# Patient Record
Sex: MALE | URBAN - METROPOLITAN AREA
[De-identification: names, ages, dates, MRNs, and addresses within clinical notes are randomized per-mention and may not be internally consistent; named-entity substitution may affect disease eponyms.]

---

## 2017-07-27 ENCOUNTER — APPOINTMENT (RX ONLY)
Dept: URBAN - METROPOLITAN AREA CLINIC 45 | Facility: CLINIC | Age: 27
Setting detail: DERMATOLOGY
End: 2017-07-27

## 2017-07-27 VITALS — WEIGHT: 180 LBS | HEIGHT: 72 IN

## 2017-07-27 DIAGNOSIS — L85.3 XEROSIS CUTIS: ICD-10-CM

## 2017-07-27 DIAGNOSIS — L91.0 HYPERTROPHIC SCAR: ICD-10-CM

## 2017-07-27 DIAGNOSIS — L70.0 ACNE VULGARIS: ICD-10-CM

## 2017-07-27 PROCEDURE — ? COUNSELING

## 2017-07-27 PROCEDURE — ? TREATMENT REGIMEN

## 2017-07-27 PROCEDURE — 11900 INJECT SKIN LESIONS </W 7: CPT

## 2017-07-27 PROCEDURE — ? INTRALESIONAL KENALOG

## 2017-07-27 PROCEDURE — 99213 OFFICE O/P EST LOW 20 MIN: CPT | Mod: 25

## 2017-07-27 PROCEDURE — ? PRESCRIPTION

## 2017-07-27 RX ORDER — BENZOYL PEROXIDE 95 MG/G
AEROSOL, FOAM TOPICAL
Qty: 1 | Refills: 5 | Status: ERX | COMMUNITY
Start: 2017-07-27

## 2017-07-27 RX ORDER — MINOCYCLINE HYDROCHLORIDE 115 MG/1
TABLET, FILM COATED, EXTENDED RELEASE ORAL
Qty: 30 | Refills: 3 | Status: ERX | COMMUNITY
Start: 2017-07-27

## 2017-07-27 RX ORDER — CLINDAMYCIN PHOSPHATE 10 MG/G
AEROSOL, FOAM TOPICAL
Qty: 1 | Refills: 4 | Status: ERX | COMMUNITY
Start: 2017-07-27

## 2017-07-27 RX ADMIN — BENZOYL PEROXIDE: 95 AEROSOL, FOAM TOPICAL at 00:00

## 2017-07-27 RX ADMIN — CLINDAMYCIN PHOSPHATE: 10 AEROSOL, FOAM TOPICAL at 00:00

## 2017-07-27 RX ADMIN — MINOCYCLINE HYDROCHLORIDE: 115 TABLET, FILM COATED, EXTENDED RELEASE ORAL at 00:00

## 2017-07-27 ASSESSMENT — LOCATION DETAILED DESCRIPTION DERM
LOCATION DETAILED: LEFT SUPERIOR LATERAL UPPER BACK
LOCATION DETAILED: LEFT SUPERIOR MEDIAL MIDBACK
LOCATION DETAILED: RIGHT CENTRAL MALAR CHEEK
LOCATION DETAILED: RIGHT INFERIOR LATERAL FOREHEAD
LOCATION DETAILED: INFERIOR LUMBAR SPINE
LOCATION DETAILED: RIGHT INFERIOR MEDIAL UPPER BACK
LOCATION DETAILED: LEFT SUPERIOR MEDIAL FOREHEAD
LOCATION DETAILED: INFERIOR THORACIC SPINE
LOCATION DETAILED: RIGHT SUPERIOR MEDIAL UPPER BACK
LOCATION DETAILED: LEFT LATERAL MALAR CHEEK
LOCATION DETAILED: RIGHT MID-UPPER BACK
LOCATION DETAILED: LEFT INFERIOR LATERAL FOREHEAD

## 2017-07-27 ASSESSMENT — LOCATION SIMPLE DESCRIPTION DERM
LOCATION SIMPLE: RIGHT CHEEK
LOCATION SIMPLE: LEFT UPPER BACK
LOCATION SIMPLE: LEFT FOREHEAD
LOCATION SIMPLE: LEFT LOWER BACK
LOCATION SIMPLE: UPPER BACK
LOCATION SIMPLE: LEFT CHEEK
LOCATION SIMPLE: RIGHT FOREHEAD
LOCATION SIMPLE: RIGHT UPPER BACK
LOCATION SIMPLE: LOWER BACK

## 2017-07-27 ASSESSMENT — LOCATION ZONE DERM
LOCATION ZONE: FACE
LOCATION ZONE: TRUNK

## 2017-07-27 NOTE — PROCEDURE: INTRALESIONAL KENALOG
Medical Necessity Clause: This procedure was medically necessary because the lesions that were treated were:
Include Z78.9 (Other Specified Conditions Influencing Health Status) As An Associated Diagnosis?: No
Consent: The risks of atrophy were reviewed with the patient.
Kenalog Preparation: Kenalog
Total Volume Injected (Ccs- Only Use Numbers And Decimals): .5
Detail Level: Detailed
X Size Of Lesion In Cm (Optional): 0
Concentration Of Solution Injected (Mg/Ml): 2.5
Total Volume Injected (Ccs- Only Use Numbers And Decimals): 1.5
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
## 2017-08-08 ENCOUNTER — RX ONLY (OUTPATIENT)
Age: 27
Setting detail: RX ONLY
End: 2017-08-08

## 2017-08-08 RX ORDER — MINOCYCLINE HYDROCHLORIDE 115 MG/1
TABLET, FILM COATED, EXTENDED RELEASE ORAL
Qty: 30 | Refills: 3 | Status: ERX

## 2017-09-27 ENCOUNTER — APPOINTMENT (RX ONLY)
Dept: URBAN - METROPOLITAN AREA CLINIC 45 | Facility: CLINIC | Age: 27
Setting detail: DERMATOLOGY
End: 2017-09-27

## 2017-09-27 DIAGNOSIS — L91.0 HYPERTROPHIC SCAR: ICD-10-CM

## 2017-09-27 DIAGNOSIS — L70.0 ACNE VULGARIS: ICD-10-CM

## 2017-09-27 PROCEDURE — 11900 INJECT SKIN LESIONS </W 7: CPT

## 2017-09-27 PROCEDURE — ? COUNSELING

## 2017-09-27 PROCEDURE — ? INTRALESIONAL KENALOG

## 2017-09-27 PROCEDURE — ? PRESCRIPTION

## 2017-09-27 PROCEDURE — 99213 OFFICE O/P EST LOW 20 MIN: CPT | Mod: 25

## 2017-09-27 PROCEDURE — ? TREATMENT REGIMEN

## 2017-09-27 RX ORDER — BENZOYL PEROXIDE 95 MG/G
AEROSOL, FOAM TOPICAL
Qty: 1 | Refills: 5 | Status: ERX

## 2017-09-27 ASSESSMENT — LOCATION DETAILED DESCRIPTION DERM
LOCATION DETAILED: LEFT SUPERIOR UPPER BACK
LOCATION DETAILED: LEFT INFERIOR LATERAL FOREHEAD
LOCATION DETAILED: INFERIOR LUMBAR SPINE
LOCATION DETAILED: RIGHT CENTRAL MALAR CHEEK
LOCATION DETAILED: RIGHT SUPERIOR MEDIAL UPPER BACK
LOCATION DETAILED: INFERIOR THORACIC SPINE
LOCATION DETAILED: LEFT LATERAL MALAR CHEEK
LOCATION DETAILED: LEFT SUPERIOR MEDIAL FOREHEAD
LOCATION DETAILED: LEFT SUPERIOR LATERAL UPPER BACK
LOCATION DETAILED: LEFT NASAL SIDEWALL
LOCATION DETAILED: RIGHT INFERIOR LATERAL FOREHEAD

## 2017-09-27 ASSESSMENT — LOCATION SIMPLE DESCRIPTION DERM
LOCATION SIMPLE: LEFT UPPER BACK
LOCATION SIMPLE: LEFT FOREHEAD
LOCATION SIMPLE: RIGHT FOREHEAD
LOCATION SIMPLE: LOWER BACK
LOCATION SIMPLE: RIGHT CHEEK
LOCATION SIMPLE: UPPER BACK
LOCATION SIMPLE: LEFT NOSE
LOCATION SIMPLE: RIGHT UPPER BACK
LOCATION SIMPLE: LEFT CHEEK

## 2017-09-27 ASSESSMENT — LOCATION ZONE DERM
LOCATION ZONE: FACE
LOCATION ZONE: NOSE
LOCATION ZONE: TRUNK
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
## 2017-09-27 NOTE — PROCEDURE: INTRALESIONAL KENALOG
Treatment Number (Optional): 2
Kenalog Preparation: Kenalog with 5-fluorouracil
X Size Of Lesion In Cm (Optional): 0
Concentration Of Solution Injected (Mg/Ml): 40.0
Detail Level: Detailed
Include Z78.9 (Other Specified Conditions Influencing Health Status) As An Associated Diagnosis?: No
Medical Necessity Clause: This procedure was medically necessary because the lesions that were treated were:
Consent: The risks of atrophy were reviewed with the patient.

## 2017-12-01 ENCOUNTER — APPOINTMENT (RX ONLY)
Dept: URBAN - METROPOLITAN AREA CLINIC 77 | Facility: CLINIC | Age: 27
Setting detail: DERMATOLOGY
End: 2017-12-01

## 2017-12-01 DIAGNOSIS — L70.0 ACNE VULGARIS: ICD-10-CM

## 2017-12-01 DIAGNOSIS — L85.3 XEROSIS CUTIS: ICD-10-CM

## 2017-12-01 DIAGNOSIS — Z79.899 OTHER LONG TERM (CURRENT) DRUG THERAPY: ICD-10-CM

## 2017-12-01 PROCEDURE — ? PRESCRIPTION

## 2017-12-01 PROCEDURE — ? COUNSELING

## 2017-12-01 PROCEDURE — ? TREATMENT REGIMEN

## 2017-12-01 PROCEDURE — 99214 OFFICE O/P EST MOD 30 MIN: CPT

## 2017-12-01 RX ORDER — ISOTRETINOIN 40 MG/1
CAPSULE ORAL
Qty: 30 | Refills: 0 | Status: ERX | COMMUNITY
Start: 2017-12-01

## 2017-12-01 RX ADMIN — ISOTRETINOIN: 40 CAPSULE ORAL at 00:00

## 2017-12-01 ASSESSMENT — LOCATION ZONE DERM: LOCATION ZONE: TRUNK

## 2017-12-01 ASSESSMENT — LOCATION SIMPLE DESCRIPTION DERM: LOCATION SIMPLE: RIGHT UPPER BACK

## 2017-12-01 ASSESSMENT — LOCATION DETAILED DESCRIPTION DERM
LOCATION DETAILED: RIGHT SUPERIOR UPPER BACK
LOCATION DETAILED: RIGHT MEDIAL UPPER BACK
LOCATION DETAILED: RIGHT MID-UPPER BACK

## 2017-12-01 NOTE — PROCEDURE: TREATMENT REGIMEN
Detail Level: Zone
Plan: Location: chest, back\\nPrescribe: Absorica 40mg po qd x 30 days\\nPharmacy: [facility]\\n---Starting 1st month-----\\nPt is here for a follow up after taking Solodyn 115mg, riax 9.5% foam, and evoclin 1% foam \\nDiscussed with him that his acne has improved with the current treatment regimen, however he has a habit of picking whenever new acne lesions form. Instructed him that if he avoids picking, his acne can continue to improve. Advised that if his acne is painful, then Accutane is another option. \\nPt states that he is tired of having painful nodulocystic acne and feels comfortable starting on Accutane therapy\\nWe had a long discussion about starting Accutane, side effects to be cognizant of, and the ipledge program.\\nHe is concerned about the side effects from isotretinoin because he is currently taking Cymbalta (anti depressant), however I reassured him that we monitor him on a monthly basis and instructed that if he notices abnormal side effects, can address it.\\nToday we will enroll him in ipledge and start him on his first month of Accutane.\\nInstructed to take Accutane with a fatty meal to properly absorb isotretinoin.\\nRecommend using Cerave MC to help reestablish a healthy skin barrier and limit soap to only oily areas of the body

## 2018-01-10 ENCOUNTER — APPOINTMENT (RX ONLY)
Dept: URBAN - METROPOLITAN AREA CLINIC 45 | Facility: CLINIC | Age: 28
Setting detail: DERMATOLOGY
End: 2018-01-10

## 2018-01-10 DIAGNOSIS — L85.3 XEROSIS CUTIS: ICD-10-CM

## 2018-01-10 DIAGNOSIS — L70.0 ACNE VULGARIS: ICD-10-CM

## 2018-01-10 DIAGNOSIS — L91.0 HYPERTROPHIC SCAR: ICD-10-CM

## 2018-01-10 DIAGNOSIS — Z79.899 OTHER LONG TERM (CURRENT) DRUG THERAPY: ICD-10-CM

## 2018-01-10 PROCEDURE — 99214 OFFICE O/P EST MOD 30 MIN: CPT | Mod: 25

## 2018-01-10 PROCEDURE — ? TREATMENT REGIMEN

## 2018-01-10 PROCEDURE — ? COUNSELING

## 2018-01-10 PROCEDURE — 11900 INJECT SKIN LESIONS </W 7: CPT

## 2018-01-10 PROCEDURE — ? INTRALESIONAL KENALOG

## 2018-01-10 PROCEDURE — ? PRESCRIPTION

## 2018-01-10 RX ORDER — ISOTRETINOIN 40 MG/1
CAPSULE ORAL
Qty: 60 | Refills: 0 | Status: ERX

## 2018-01-10 ASSESSMENT — LOCATION DETAILED DESCRIPTION DERM
LOCATION DETAILED: LEFT SUPERIOR LATERAL UPPER BACK
LOCATION DETAILED: RIGHT MEDIAL UPPER BACK
LOCATION DETAILED: LEFT SUPERIOR UPPER BACK
LOCATION DETAILED: RIGHT MID-UPPER BACK
LOCATION DETAILED: RIGHT SUPERIOR UPPER BACK

## 2018-01-10 ASSESSMENT — LOCATION SIMPLE DESCRIPTION DERM
LOCATION SIMPLE: LEFT UPPER BACK
LOCATION SIMPLE: RIGHT UPPER BACK

## 2018-01-10 ASSESSMENT — LOCATION ZONE DERM: LOCATION ZONE: TRUNK

## 2018-01-10 NOTE — PROCEDURE: INTRALESIONAL KENALOG
X Size Of Lesion In Cm (Optional): 0
Concentration Of Solution Injected (Mg/Ml): 40.0
Total Volume Injected (Ccs- Only Use Numbers And Decimals): 1
Include Z78.9 (Other Specified Conditions Influencing Health Status) As An Associated Diagnosis?: No
Kenalog Preparation: Kenalog
Consent: The risks of atrophy were reviewed with the patient.
Medical Necessity Clause: This procedure was medically necessary because the lesions that were treated were:
Detail Level: Detailed

## 2018-01-10 NOTE — PROCEDURE: TREATMENT REGIMEN
Plan: Location: Face/Chest/Back\\nDuration: Finished 1st- Going on 2nd\\nPrescribe: Myorisan 80mg po qd x 30 days - we are sending for Absorica with hope that he eventually gets the better medication, but will most likely receive Myorisan\\n                   Can continue Riax and Evoclin Foam if able to tolerate \\n[facility]armacy: [facility]\\nIpledge: [phone]\\n\\n\\nPatient is here for one month follow up and states that he is noticing a lot of improvement to his acne after his first month of Accutane.\\nPatient states that he has absolutely no side effects other than dryness.\\nPatient forgot to do labwork but will get it done TODAY.\\nDiscussed with patient that we will increase dosage to 80mg po QD and if he does not hear from us, that means his blood work came back normal when we review it tomorrow.\\nDiscussed with him that his acne has improved with the current treatment regimen, however he has a habit of picking whenever new acne lesions form. Instructed him that if he avoids picking, his acne can continue to improve. Advised that if his acne is painful, then Accutane is another option. \\nPt states that he is tired of having painful nodulocystic acne and feels comfortable beingon Accutane therapy\\nDiscussed what side effects to be cognizant of, and the ipledge program.\\nHe is concerned about the side effects from isotretinoin because he is currently taking Cymbalta (anti depressant), however I reassured him that we monitor him on a monthly basis and instructed that if he notices abnormal side effects, can address it.\\nToday we will enroll him in ipledge and start him on his first month of Accutane.\\nInstructed to take Accutane with a fatty meal to properly absorb isotretinoin.\\nRecommend using Cerave MC to help reestablish a healthy skin barrier and limit soap to only oily areas of the body
Detail Level: Zone

## 2018-02-15 ENCOUNTER — APPOINTMENT (RX ONLY)
Dept: URBAN - METROPOLITAN AREA CLINIC 45 | Facility: CLINIC | Age: 28
Setting detail: DERMATOLOGY
End: 2018-02-15

## 2018-02-15 DIAGNOSIS — L01.01 NON-BULLOUS IMPETIGO: ICD-10-CM

## 2018-02-15 DIAGNOSIS — L70.0 ACNE VULGARIS: ICD-10-CM

## 2018-02-15 DIAGNOSIS — A63.0 ANOGENITAL (VENEREAL) WARTS: ICD-10-CM

## 2018-02-15 DIAGNOSIS — B00.1 HERPESVIRAL VESICULAR DERMATITIS: ICD-10-CM

## 2018-02-15 PROCEDURE — 17110 DESTRUCTION B9 LES UP TO 14: CPT

## 2018-02-15 PROCEDURE — ? LIQUID NITROGEN

## 2018-02-15 PROCEDURE — 99214 OFFICE O/P EST MOD 30 MIN: CPT | Mod: 25

## 2018-02-15 PROCEDURE — ? COUNSELING

## 2018-02-15 PROCEDURE — ? TREATMENT REGIMEN

## 2018-02-15 PROCEDURE — ? PRESCRIPTION

## 2018-02-15 RX ORDER — VALACYCLOVIR HYDROCHLORIDE 1 G/1
TABLET, FILM COATED ORAL
Qty: 30 | Refills: 1 | Status: ERX | COMMUNITY
Start: 2018-02-15

## 2018-02-15 RX ORDER — SULFAMETHOXAZOLE AND TRIMETHOPRIM 800; 160 MG/1; MG/1
TABLET ORAL
Qty: 28 | Refills: 0 | Status: ERX | COMMUNITY
Start: 2018-02-15

## 2018-02-15 RX ORDER — ISOTRETINOIN 40 MG/1
CAPSULE ORAL
Qty: 60 | Refills: 0 | Status: ERX

## 2018-02-15 RX ADMIN — VALACYCLOVIR HYDROCHLORIDE: 1 TABLET, FILM COATED ORAL at 00:00

## 2018-02-15 RX ADMIN — SULFAMETHOXAZOLE AND TRIMETHOPRIM: 800; 160 TABLET ORAL at 00:00

## 2018-02-15 ASSESSMENT — LOCATION DETAILED DESCRIPTION DERM
LOCATION DETAILED: RIGHT MID-UPPER BACK
LOCATION DETAILED: RIGHT DORSAL SHAFT OF PENIS
LOCATION DETAILED: RIGHT MEDIAL UPPER BACK

## 2018-02-15 ASSESSMENT — LOCATION ZONE DERM
LOCATION ZONE: TRUNK
LOCATION ZONE: PENIS

## 2018-02-15 ASSESSMENT — LOCATION SIMPLE DESCRIPTION DERM
LOCATION SIMPLE: RIGHT UPPER BACK
LOCATION SIMPLE: PENIS

## 2018-02-15 NOTE — PROCEDURE: LIQUID NITROGEN
Medical Necessity Information: It is in your best interest to select a reason for this procedure from the list below. All of these items fulfill various CMS LCD requirements except the new and changing color options.
Add 52 Modifier (Optional): no
Number Of Freeze-Thaw Cycles: 3 freeze-thaw cycles
Medical Necessity Clause: This procedure was medically necessary because the lesions that were treated were:
Post-Care Instructions: I reviewed with the patient in detail post-care instructions. Patient is to wear sunprotection, and avoid picking at any of the treated lesions. Pt may apply Vaseline to crusted or scabbing areas.
Detail Level: Detailed
Consent: The patient's consent was obtained including but not limited to risks of crusting, scabbing, blistering, scarring, darker or lighter pigmentary change, recurrence, incomplete removal and infection.

## 2018-02-15 NOTE — PROCEDURE: TREATMENT REGIMEN
Plan: Location: penis\\nTreatment: LN2-1X\\n\\nDiscussed with patient that they are a common HPV viral infection that occurs during stress and immune system is unable to fight the virus.\\nWill treat with cryotherapy today to allow immune system to fight off virus. \\nDiscussed with pt if wart blister ups, then can use sterile needle to poke and let fluid drain.\\nDiscussed with pt that they should leave skin attached to help provide a protective barrier while it is healing.\\n\\nDiscussed with pt if it does not disappear, then we my have to treat with Cidofovir anti viral injection at future appointment.\\nAlthough it is not FDA approved, is very effective. \\nDiscussed anticipated side effects of Cidofovir treatment that may include discomfort, burning, swelling, redness, and blistering.\\nF/u as needed
Detail Level: Zone
Plan: Location: lips\\nPrescribe: Bactrim  mg-160 mg tablet (Take 1 tablet by mouth twice a day for 2 weeks)\\n\\nPt has irritation around lips today.\\nPt discussed that it is very irritated and inflamed today.\\nToday there is honey yellow crust developing around the inflammation.\\nDiscussed with pt that this is most likely impetigo.\\nDiscussed with pt that this is a bacterial skin infection that often comes on irritated skin.\\nDiscussed being gentle with the skin---no peroxide or etoh, no neosporin.\\n\\nWill also prescribe pt Bactrim  mg-160 mg tablet (Take 1 tablet by mouth twice a day for 2 weeks) to help fight infection.\\nF/u as needed
Plan: Location: Face/Chest/Back\\nDuration: Finished 2nd - Going on 3nd\\nPrescribe: Myorisan 80mg po qd x 30 days - we are sending for Absorica with hope that he eventually gets the better medication, but will most likely receive Myorisan\\n[facility]armacy: [facility]\\nIpledge: [phone]\\n\\nPatient is here for one month follow up.\\nPt discussed that he is on Myorisan 80mg qd with fatty meal.\\nPatient states that he has absolutely no side effects other than dryness.\\nDiscussed with pt that his lips look infected with impetigo today.\\nWill send off Bactrim DS BID x 14 days to help fight infection.\\nDiscussed with pt that he could take 2-3 days off medication to let his lips heal.\\n\\nDiscussed with him that his acne has improved greatly with the current treatment regimen.\\nDiscussed with pt that he has mostly post inflammatory hyperpigmentation on back and face today.\\nDiscussed with pt to not pick at new acne lesions and advised him that he could use bandages to help him from not picking.\\n\\nPatient discussed that he forgot to do labwork but will get it done TODAY.\\nDiscussed with pt that we will continue him on 80mg po QD.\\nDiscussed with pt that if he does not hear from us, that means his blood work came back normal.\\nInstructed to take Accutane with a fatty meal to properly absorb isotretinoin.\\nDiscussed what side effects to be cognizant of, and the ipledge program.\\nRecommend using Cerave MC to help reestablish a healthy skin barrier and limit soap to only oily areas of the body.\\n\\nPt is currently taking Cymbalta (anti depressant) instructed that if he notices abnormal side effects, can address it.\\nF/u in 1 month

## 2018-03-16 ENCOUNTER — RX ONLY (OUTPATIENT)
Age: 28
Setting detail: RX ONLY
End: 2018-03-16

## 2018-03-16 RX ORDER — SULFAMETHOXAZOLE AND TRIMETHOPRIM 800; 160 MG/1; MG/1
TABLET ORAL
Qty: 10 | Refills: 0 | Status: ERX

## 2018-03-21 ENCOUNTER — APPOINTMENT (RX ONLY)
Dept: URBAN - METROPOLITAN AREA CLINIC 45 | Facility: CLINIC | Age: 28
Setting detail: DERMATOLOGY
End: 2018-03-21

## 2018-03-21 DIAGNOSIS — L85.3 XEROSIS CUTIS: ICD-10-CM

## 2018-03-21 DIAGNOSIS — L70.0 ACNE VULGARIS: ICD-10-CM

## 2018-03-21 DIAGNOSIS — Z79.899 OTHER LONG TERM (CURRENT) DRUG THERAPY: ICD-10-CM

## 2018-03-21 PROCEDURE — 99214 OFFICE O/P EST MOD 30 MIN: CPT

## 2018-03-21 PROCEDURE — ? COUNSELING

## 2018-03-21 PROCEDURE — ? PRESCRIPTION

## 2018-03-21 PROCEDURE — ? TREATMENT REGIMEN

## 2018-03-21 RX ORDER — CLOBETASOL PROPIONATE 0.46 MG/ML
SOLUTION TOPICAL
Qty: 1 | Refills: 3 | Status: ERX | COMMUNITY
Start: 2018-03-21

## 2018-03-21 RX ORDER — ISOTRETINOIN 40 MG/1
CAPSULE ORAL
Qty: 60 | Refills: 0 | Status: ERX

## 2018-03-21 RX ADMIN — CLOBETASOL PROPIONATE: 0.46 SOLUTION TOPICAL at 00:00

## 2018-03-21 ASSESSMENT — LOCATION DETAILED DESCRIPTION DERM
LOCATION DETAILED: RIGHT MEDIAL UPPER BACK
LOCATION DETAILED: RIGHT MID-UPPER BACK

## 2018-03-21 ASSESSMENT — LOCATION ZONE DERM: LOCATION ZONE: TRUNK

## 2018-03-21 ASSESSMENT — LOCATION SIMPLE DESCRIPTION DERM: LOCATION SIMPLE: RIGHT UPPER BACK

## 2018-03-21 NOTE — PROCEDURE: TREATMENT REGIMEN
Plan: Location: Face/Chest/Back\\nDuration: Finished 3rd- Going on 4th\\nPrescribe: Myorisan 80mg po qd x 30 days - we are sending for Absorica with hope that he eventually gets the better medication, but will most likely receive Myorisan\\nclobetasol 0.05 % scalp solution --Apply to scalp once daily when having flare up \\n[facility]armacy: [facility]\\nIpledge: [phone]\\n\\nPatient is here for 4th month acne follow up and is responding well to medication\\nPt is currently taking/ will continue pt on Myorisan 80mg qd with fatty meal.\\nPatient states that he has absolutely no side effects other than dryness.\\nDiscussed with pt that his lips don’t look as infected with impetigo today— pt is currently taking Bactrim DS BID x 14 days to help fight infection\\nInstructed pt to take 5 days off medication to let his lips heal— instructed to keep lips well moisturized with Aquaphor \\nPt states that his scalp is also very itchy and he is constantly scratching-- will prescribe clobetasol 0.05 % scalp solution Scalp-- Apply to scalp once daily when having flare up; discussed with pt that he can apply solution on scaly, irritated spots on body if neeeded\\n\\nDiscussed with him that his acne has improved greatly with the current treatment regimen.\\nDiscussed with pt that he has mostly post inflammatory hyperpigmentation on back and face today.\\nDiscussed with pt to not pick at new acne lesions and advised him that he could use bandages to help him from not picking.\\n\\nInstructed to take Accutane with a fatty meal to properly absorb isotretinoin.\\nDiscussed what side effects to be cognizant of, and the ipledge program.\\nRecommend using Cerave MC to help reestablish a healthy skin barrier and limit soap to only oily areas of the body.\\n\\nPt is currently taking Cymbalta (anti depressant) instructed that if he notices abnormal side effects, can address it.\\n\\nF/u in 1 month
Detail Level: Zone

## 2018-03-23 ENCOUNTER — RX ONLY (OUTPATIENT)
Age: 28
Setting detail: RX ONLY
End: 2018-03-23

## 2018-03-23 RX ORDER — SULFAMETHOXAZOLE AND TRIMETHOPRIM 800; 160 MG/1; MG/1
TABLET ORAL
Qty: 20 | Refills: 0 | Status: ERX

## 2018-04-25 ENCOUNTER — APPOINTMENT (RX ONLY)
Dept: URBAN - METROPOLITAN AREA CLINIC 45 | Facility: CLINIC | Age: 28
Setting detail: DERMATOLOGY
End: 2018-04-25

## 2018-04-25 DIAGNOSIS — L85.3 XEROSIS CUTIS: ICD-10-CM

## 2018-04-25 DIAGNOSIS — Z79.899 OTHER LONG TERM (CURRENT) DRUG THERAPY: ICD-10-CM

## 2018-04-25 DIAGNOSIS — L70.0 ACNE VULGARIS: ICD-10-CM

## 2018-04-25 PROCEDURE — ? PRESCRIPTION

## 2018-04-25 PROCEDURE — 99214 OFFICE O/P EST MOD 30 MIN: CPT

## 2018-04-25 PROCEDURE — ? TREATMENT REGIMEN

## 2018-04-25 PROCEDURE — ? COUNSELING

## 2018-04-25 RX ORDER — ISOTRETINOIN 40 MG/1
CAPSULE ORAL
Qty: 60 | Refills: 0 | Status: ERX

## 2018-04-25 ASSESSMENT — LOCATION ZONE DERM: LOCATION ZONE: TRUNK

## 2018-04-25 ASSESSMENT — LOCATION SIMPLE DESCRIPTION DERM: LOCATION SIMPLE: RIGHT UPPER BACK

## 2018-04-25 ASSESSMENT — LOCATION DETAILED DESCRIPTION DERM
LOCATION DETAILED: RIGHT MEDIAL UPPER BACK
LOCATION DETAILED: RIGHT MID-UPPER BACK

## 2018-04-25 NOTE — PROCEDURE: TREATMENT REGIMEN
Detail Level: Zone
Plan: Location: Face/Chest/Back\\nDuration: Finished 4th - Going on 5th\\nPrescribe: Myorisan 80mg po qd x 30 days - we are sending for Absorica with hope that he eventually gets the better medication, but will most likely receive Myorisan\\nContinue clobetasol 0.05 % scalp solution --Apply to scalp once daily when having flare up \\nPharmacy: [facility]\\nIpledge: [phone]\\n\\nPatient is here for one month acne follow up and is still responding well to medication\\nPt is currently taking/ will continue pt on Myorisan 80mg qd with fatty meal.\\nPatient states that he has absolutely no side effects other than dryness.\\nDiscussed with pt that his lips don’t look as infected with impetigo today— patient was previously taking Bactrim DS BID x 14 days to help fight infection\\n\\nDiscussed with him that his acne has improved greatly with the current treatment regimen.\\nDiscussed with pt that he has mostly post inflammatory hyperpigmentation on back and face today.\\nDiscussed with pt to not pick at new acne lesions and advised him that he could use bandages to help him from not picking.\\n\\nInstructed to take Accutane with a fatty meal to properly absorb isotretinoin.\\nDiscussed what side effects to be cognizant of, and the ipledge program.\\nRecommend using Cerave MC to help reestablish a healthy skin barrier and limit soap to only oily areas of the body.\\n\\nPt is currently taking Cymbalta (anti depressant) instructed that if he notices abnormal side effects, can address it.\\n\\nF/u in 1 month

## 2018-06-06 ENCOUNTER — APPOINTMENT (RX ONLY)
Dept: URBAN - METROPOLITAN AREA CLINIC 45 | Facility: CLINIC | Age: 28
Setting detail: DERMATOLOGY
End: 2018-06-06

## 2018-06-06 DIAGNOSIS — Z79.899 OTHER LONG TERM (CURRENT) DRUG THERAPY: ICD-10-CM

## 2018-06-06 DIAGNOSIS — L70.0 ACNE VULGARIS: ICD-10-CM

## 2018-06-06 DIAGNOSIS — L85.3 XEROSIS CUTIS: ICD-10-CM

## 2018-06-06 PROCEDURE — ? PRESCRIPTION

## 2018-06-06 PROCEDURE — ? TREATMENT REGIMEN

## 2018-06-06 PROCEDURE — 99214 OFFICE O/P EST MOD 30 MIN: CPT

## 2018-06-06 PROCEDURE — ? COUNSELING

## 2018-06-06 RX ORDER — ISOTRETINOIN 40 MG/1
CAPSULE ORAL
Qty: 60 | Refills: 0 | Status: ERX

## 2018-06-06 ASSESSMENT — LOCATION DETAILED DESCRIPTION DERM
LOCATION DETAILED: RIGHT MEDIAL UPPER BACK
LOCATION DETAILED: RIGHT MID-UPPER BACK

## 2018-06-06 ASSESSMENT — LOCATION SIMPLE DESCRIPTION DERM: LOCATION SIMPLE: RIGHT UPPER BACK

## 2018-06-06 ASSESSMENT — LOCATION ZONE DERM: LOCATION ZONE: TRUNK

## 2018-06-06 NOTE — PROCEDURE: TREATMENT REGIMEN
Detail Level: Zone
Plan: Location: Face/Chest/Back\\nDuration: Finished 5th - Going on 6th\\nPrescribe: Myorisan 80mg po qd x 30 days - we are sending for Absorica with hope that he eventually gets the better medication, but will most likely receive Myorisan\\n[facility]armacy: [facility]\\nIpledge: [phone]\\n\\nPatient is here for one month acne follow up and is still responding well to medication\\nToday, Pt presents post-inflammatory hyperpigmentation on his face and back \\nPt is currently taking/ will continue pt on Myorisan 80mg qd with fatty meal.\\nPatient states that he has absolutely no side effects other than dryness.\\nDiscussed with pt that his lips don’t look as infected with impetigo today— patient was previously taking Bactrim DS BID x 14 days to help fight infection; Pt states he currently uses Lotrimin cream QD to prevent infection \\n\\nDiscussed with him that his acne has improved greatly with the current treatment regimen.\\nDiscussed with pt that he has mostly post inflammatory hyperpigmentation on back and face today.\\nDiscussed with pt to not pick at new acne lesions and advised him that he could use bandages to help him from not picking.\\n\\nInstructed to take Accutane with a fatty meal to properly absorb isotretinoin.\\nDiscussed what side effects to be cognizant of, and the ipledge program.\\nRecommend using Cerave MC to help reestablish a healthy skin barrier and limit soap to only oily areas of the body.\\n\\nPt is currently taking Cymbalta (anti depressant) instructed that if he notices abnormal side effects, can address it.\\n\\nF/u in 3 months

## 2018-07-30 ENCOUNTER — RX ONLY (OUTPATIENT)
Age: 28
Setting detail: RX ONLY
End: 2018-07-30

## 2018-07-30 RX ORDER — SULFAMETHOXAZOLE AND TRIMETHOPRIM 800; 160 MG/1; MG/1
TABLET ORAL
Qty: 20 | Refills: 0 | Status: ERX

## 2018-09-06 ENCOUNTER — APPOINTMENT (RX ONLY)
Dept: URBAN - METROPOLITAN AREA CLINIC 45 | Facility: CLINIC | Age: 28
Setting detail: DERMATOLOGY
End: 2018-09-06

## 2018-09-06 DIAGNOSIS — Z79.899 OTHER LONG TERM (CURRENT) DRUG THERAPY: ICD-10-CM

## 2018-09-06 DIAGNOSIS — L70.0 ACNE VULGARIS: ICD-10-CM

## 2018-09-06 DIAGNOSIS — L91.0 HYPERTROPHIC SCAR: ICD-10-CM

## 2018-09-06 DIAGNOSIS — L85.3 XEROSIS CUTIS: ICD-10-CM

## 2018-09-06 PROCEDURE — ? INTRALESIONAL KENALOG

## 2018-09-06 PROCEDURE — ? TREATMENT REGIMEN

## 2018-09-06 PROCEDURE — 99213 OFFICE O/P EST LOW 20 MIN: CPT | Mod: 25

## 2018-09-06 PROCEDURE — ? COUNSELING

## 2018-09-06 ASSESSMENT — LOCATION DETAILED DESCRIPTION DERM
LOCATION DETAILED: LEFT MID-UPPER BACK
LOCATION DETAILED: RIGHT MID-UPPER BACK
LOCATION DETAILED: LEFT SUPERIOR LATERAL UPPER BACK
LOCATION DETAILED: RIGHT POSTERIOR SHOULDER
LOCATION DETAILED: RIGHT INFERIOR LATERAL FOREHEAD
LOCATION DETAILED: LEFT SUPERIOR UPPER BACK
LOCATION DETAILED: RIGHT MEDIAL UPPER BACK
LOCATION DETAILED: RIGHT MID TEMPLE

## 2018-09-06 ASSESSMENT — LOCATION SIMPLE DESCRIPTION DERM
LOCATION SIMPLE: RIGHT UPPER BACK
LOCATION SIMPLE: RIGHT TEMPLE
LOCATION SIMPLE: LEFT UPPER BACK
LOCATION SIMPLE: RIGHT FOREHEAD
LOCATION SIMPLE: RIGHT SHOULDER

## 2018-09-06 ASSESSMENT — LOCATION ZONE DERM
LOCATION ZONE: TRUNK
LOCATION ZONE: FACE
LOCATION ZONE: ARM

## 2018-09-06 NOTE — PROCEDURE: INTRALESIONAL KENALOG
Medical Necessity Clause: This procedure was medically necessary because the lesions that were treated were:
Include Z78.9 (Other Specified Conditions Influencing Health Status) As An Associated Diagnosis?: No
Detail Level: Detailed
Total Volume Injected (Ccs- Only Use Numbers And Decimals): 2
X Size Of Lesion In Cm (Optional): 0
Concentration Of Solution Injected (Mg/Ml): 40.0
Consent: The risks of atrophy were reviewed with the patient.
Kenalog Preparation: Kenalog

## 2018-09-06 NOTE — PROCEDURE: TREATMENT REGIMEN
Plan: Location: Face/Chest/Back\\nDuration: Finished 7th --- completed\\nPrescribe: Myorisan 80mg po qd x 30 days - did not send today\\n[facility]armacy: [facility]\\nIpledge: [phone]\\n\\nPatient is here for 3 month acne follow up.\\nPt discussed that he has been off of medication for 2 months and has not had a flare up.\\nPt states that he still has some hypertrophic scars from previous flare ups.\\nDiscussed with pt that his acne does not look as bad as we first started.\\nWill inject keloids with 5FU/ILK 40 today to help reduce inflammation.\\nF/u as needed \\n-----------------------\\nToday, Pt presents post-inflammatory hyperpigmentation on his face and back \\nPt is currently taking/ will continue pt on Myorisan 80mg qd with fatty meal.\\nPatient states that he has absolutely no side effects other than dryness.\\nDiscussed with pt that his lips don’t look as infected with impetigo today— patient was previously taking Bactrim DS BID x 14 days to help fight infection; Pt states he currently uses Lotrimin cream QD to prevent infection \\n\\nDiscussed with him that his acne has improved greatly with the current treatment regimen.\\nDiscussed with pt that he has mostly post inflammatory hyperpigmentation on back and face today.\\nDiscussed with pt to not pick at new acne lesions and advised him that he could use bandages to help him from not picking.\\n\\nInstructed to take Accutane with a fatty meal to properly absorb isotretinoin.\\nDiscussed what side effects to be cognizant of, and the ipledge program.\\nRecommend using Cerave MC to help reestablish a healthy skin barrier and limit soap to only oily areas of the body.\\n\\nPt is currently taking Cymbalta (anti depressant) instructed that if he notices abnormal side effects, can address it.\\n\\nF/u in 3 months
Detail Level: Zone

## 2018-10-04 ENCOUNTER — APPOINTMENT (RX ONLY)
Dept: URBAN - METROPOLITAN AREA CLINIC 45 | Facility: CLINIC | Age: 28
Setting detail: DERMATOLOGY
End: 2018-10-04

## 2018-10-04 DIAGNOSIS — A63.0 ANOGENITAL (VENEREAL) WARTS: ICD-10-CM

## 2018-10-04 DIAGNOSIS — L663 OTHER SPECIFIED DISEASES OF HAIR AND HAIR FOLLICLES: ICD-10-CM

## 2018-10-04 DIAGNOSIS — L85.3 XEROSIS CUTIS: ICD-10-CM

## 2018-10-04 DIAGNOSIS — L70.0 ACNE VULGARIS: ICD-10-CM

## 2018-10-04 DIAGNOSIS — L73.9 FOLLICULAR DISORDER, UNSPECIFIED: ICD-10-CM

## 2018-10-04 DIAGNOSIS — L738 OTHER SPECIFIED DISEASES OF HAIR AND HAIR FOLLICLES: ICD-10-CM

## 2018-10-04 DIAGNOSIS — B35.1 TINEA UNGUIUM: ICD-10-CM

## 2018-10-04 PROBLEM — L02.92 FURUNCLE, UNSPECIFIED: Status: ACTIVE | Noted: 2018-10-04

## 2018-10-04 PROCEDURE — 17110 DESTRUCTION B9 LES UP TO 14: CPT

## 2018-10-04 PROCEDURE — ? COUNSELING

## 2018-10-04 PROCEDURE — ? INTRALESIONAL KENALOG

## 2018-10-04 PROCEDURE — ? LIQUID NITROGEN

## 2018-10-04 PROCEDURE — ? TREATMENT REGIMEN

## 2018-10-04 PROCEDURE — 99213 OFFICE O/P EST LOW 20 MIN: CPT | Mod: 25

## 2018-10-04 PROCEDURE — ? PRESCRIPTION

## 2018-10-04 RX ORDER — CLINDAMYCIN PHOSPHATE 10 MG/G
AEROSOL, FOAM TOPICAL
Qty: 1 | Refills: 3 | Status: ERX

## 2018-10-04 RX ORDER — UREA 410 MG/G
CREAM TOPICAL
Qty: 1 | Refills: 3 | Status: ERX | COMMUNITY
Start: 2018-10-04

## 2018-10-04 RX ORDER — BENZOYL PEROXIDE 95 MG/G
AEROSOL, FOAM TOPICAL
Qty: 1 | Refills: 3 | Status: ERX

## 2018-10-04 RX ADMIN — UREA: 410 CREAM TOPICAL at 00:00

## 2018-10-04 ASSESSMENT — LOCATION DETAILED DESCRIPTION DERM
LOCATION DETAILED: LEFT DORSAL SHAFT OF PENIS
LOCATION DETAILED: LEFT SUPERIOR LATERAL UPPER BACK
LOCATION DETAILED: RIGHT MEDIAL UPPER BACK
LOCATION DETAILED: RIGHT MID-UPPER BACK
LOCATION DETAILED: RIGHT POSTERIOR SHOULDER
LOCATION DETAILED: LEFT MEDIAL SUPERIOR CHEST
LOCATION DETAILED: LEFT MEDIAL UPPER BACK

## 2018-10-04 ASSESSMENT — LOCATION SIMPLE DESCRIPTION DERM
LOCATION SIMPLE: PENIS
LOCATION SIMPLE: RIGHT UPPER BACK
LOCATION SIMPLE: CHEST
LOCATION SIMPLE: RIGHT SHOULDER
LOCATION SIMPLE: LEFT UPPER BACK

## 2018-10-04 ASSESSMENT — LOCATION ZONE DERM
LOCATION ZONE: PENIS
LOCATION ZONE: TRUNK
LOCATION ZONE: ARM

## 2018-10-04 NOTE — PROCEDURE: LIQUID NITROGEN
Number Of Freeze-Thaw Cycles: 3 freeze-thaw cycles
Detail Level: Detailed
Medical Necessity Clause: This procedure was medically necessary because the lesions that were treated were:
Consent: The patient's consent was obtained including but not limited to risks of crusting, scabbing, blistering, scarring, darker or lighter pigmentary change, recurrence, incomplete removal and infection.
Add 52 Modifier (Optional): no
Medical Necessity Information: It is in your best interest to select a reason for this procedure from the list below. All of these items fulfill various CMS LCD requirements except the new and changing color options.
Post-Care Instructions: I reviewed with the patient in detail post-care instructions. Patient is to wear sunprotection, and avoid picking at any of the treated lesions. Pt may apply Vaseline to crusted or scabbing areas.

## 2018-10-04 NOTE — PROCEDURE: INTRALESIONAL KENALOG
Detail Level: Detailed
Include Z78.9 (Other Specified Conditions Influencing Health Status) As An Associated Diagnosis?: No
X Size Of Lesion In Cm (Optional): 0
Concentration Of Solution Injected (Mg/Ml): 2.5
Total Volume Injected (Ccs- Only Use Numbers And Decimals): 1
Consent: The risks of atrophy were reviewed with the patient.
Kenalog Preparation: Kenalog
Medical Necessity Clause: This procedure was medically necessary because the lesions that were treated were:

## 2018-10-04 NOTE — PROCEDURE: TREATMENT REGIMEN
Plan: Location: Face/Chest/Back\\nDuration: Finished 7th --- completed\\nPrescribe: Myorisan 80mg po qd x 30 days - did not send today\\nPharmacy: [facility]\\nIpledge: [phone]\\n\\nTreatment: 1 cc of 5FU/K40\\n\\nPatient reports his skin is doing “great” and has had no flares the past few months\\nPatient has been off of a Accutane for 2-3 months \\nPatient reports he would like us to treat the keloid on his back \\nDiscussed with patient his keloids have become flatter \\nStrongly recommended patient to stop picking his acne and ingrown hair as it will agitate it \\nToday, will inject 1 cc of 5FU/K40 in keloids on back, chest, and right arm \\n\\nF/u in 3 months \\n—————-\\nPatient is here for 3 month acne follow up.\\nPt discussed that he has been off of medication for 2 months and has not had a flare up.\\nPt states that he still has some hypertrophic scars from previous flare ups.\\nDiscussed with pt that his acne does not look as bad as we first started.\\nWill inject keloids with 5FU/ILK 40 today to help reduce inflammation.\\nF/u as needed \\n-----------------------\\nToday, Pt presents post-inflammatory hyperpigmentation on his face and back \\nPt is currently taking/ will continue pt on Myorisan 80mg qd with fatty meal.\\nPatient states that he has absolutely no side effects other than dryness.\\nDiscussed with pt that his lips don’t look as infected with impetigo today— patient was previously taking Bactrim DS BID x 14 days to help fight infection; Pt states he currently uses Lotrimin cream QD to prevent infection \\n\\nDiscussed with him that his acne has improved greatly with the current treatment regimen.\\nDiscussed with pt that he has mostly post inflammatory hyperpigmentation on back and face today.\\nDiscussed with pt to not pick at new acne lesions and advised him that he could use bandages to help him from not picking.\\n\\nInstructed to take Accutane with a fatty meal to properly absorb isotretinoin.\\nDiscussed what side effects to be cognizant of, and the ipledge program.\\nRecommend using Cerave MC to help reestablish a healthy skin barrier and limit soap to only oily areas of the body.\\n\\nPt is currently taking Cymbalta (anti depressant) instructed that if he notices abnormal side effects, can address it.\\n\\nF/u in 3 months
Detail Level: Zone
Plan: Location: body\\nPrescribe: Riax topical foam \\n                Evoclin foam top qd x 30 days\\n\\nDiscussed with pt that folliculitis is a common skin condition in which hair follicles become inflamed.\\nDiscussed with pt that this is a form of acne can be caused by genetic or hormonal related factors.\\nAdvised acne may be treated with topical and oral antibiotics, or hormonal controlling medications.\\nAlso discussed the possibility of a cure with Accutane, advised patient to consider accutane, Discussed pros and cons, side effects and benefits.\\n\\nDiscussed with pt to take medication with food daily.\\nDiscussed with pt that we will also prescribe Evoclin foam to apply after showering, and Riax topical foam\\nDiscussed with pt to leave on and do daily as needed for flare ups.\\nFollow up as needed
Plan: Location: feet\\nTreatment: urea 41% topical cream\\n\\nPatient reports he may have “athlete’s feet” with continued roughness \\nDiscussed with patient he does not present fungal infection \\nRecommended patient to get a pedicure to help with cleanliness and roughness of feet\\nDiscussed with patient we can prescribe him a car to further reduce roughness of feet\\nToday, will prescribe urea 41% topical cream\\n\\nF/u as needed

## 2018-11-15 ENCOUNTER — APPOINTMENT (RX ONLY)
Dept: URBAN - METROPOLITAN AREA CLINIC 45 | Facility: CLINIC | Age: 28
Setting detail: DERMATOLOGY
End: 2018-11-15

## 2018-11-15 DIAGNOSIS — L73.9 FOLLICULAR DISORDER, UNSPECIFIED: ICD-10-CM

## 2018-11-15 DIAGNOSIS — L85.3 XEROSIS CUTIS: ICD-10-CM

## 2018-11-15 DIAGNOSIS — L91.0 HYPERTROPHIC SCAR: ICD-10-CM

## 2018-11-15 DIAGNOSIS — L28.1 PRURIGO NODULARIS: ICD-10-CM

## 2018-11-15 DIAGNOSIS — L738 OTHER SPECIFIED DISEASES OF HAIR AND HAIR FOLLICLES: ICD-10-CM

## 2018-11-15 DIAGNOSIS — L663 OTHER SPECIFIED DISEASES OF HAIR AND HAIR FOLLICLES: ICD-10-CM

## 2018-11-15 DIAGNOSIS — L70.0 ACNE VULGARIS: ICD-10-CM

## 2018-11-15 PROBLEM — L02.92 FURUNCLE, UNSPECIFIED: Status: ACTIVE | Noted: 2018-11-15

## 2018-11-15 PROCEDURE — ? TREATMENT REGIMEN

## 2018-11-15 PROCEDURE — ? PRESCRIPTION

## 2018-11-15 PROCEDURE — ? COUNSELING

## 2018-11-15 PROCEDURE — 99214 OFFICE O/P EST MOD 30 MIN: CPT | Mod: 25

## 2018-11-15 PROCEDURE — ? INTRALESIONAL KENALOG

## 2018-11-15 RX ORDER — HYDROXYZINE HYDROCHLORIDE 25 MG/1
TABLET, FILM COATED ORAL
Qty: 30 | Refills: 5 | Status: ERX | COMMUNITY
Start: 2018-11-15

## 2018-11-15 RX ORDER — FLURANDRENOLIDE 4 UG/CM2
TAPE TOPICAL
Qty: 2 | Refills: 5 | Status: ERX | COMMUNITY
Start: 2018-11-15

## 2018-11-15 RX ADMIN — HYDROXYZINE HYDROCHLORIDE: 25 TABLET, FILM COATED ORAL at 00:00

## 2018-11-15 RX ADMIN — FLURANDRENOLIDE: 4 TAPE TOPICAL at 00:00

## 2018-11-15 ASSESSMENT — LOCATION DETAILED DESCRIPTION DERM
LOCATION DETAILED: LEFT SUPERIOR LATERAL UPPER BACK
LOCATION DETAILED: LEFT SUPERIOR UPPER BACK
LOCATION DETAILED: RIGHT SUPERIOR UPPER BACK
LOCATION DETAILED: STERNUM
LOCATION DETAILED: RIGHT LATERAL SHOULDER
LOCATION DETAILED: RIGHT LATERAL PROXIMAL UPPER ARM
LOCATION DETAILED: RIGHT MEDIAL SUPERIOR CHEST
LOCATION DETAILED: INFERIOR THORACIC SPINE
LOCATION DETAILED: RIGHT INFERIOR MEDIAL UPPER BACK
LOCATION DETAILED: LEFT MID-UPPER BACK
LOCATION DETAILED: RIGHT MID-UPPER BACK
LOCATION DETAILED: RIGHT SUPERIOR LATERAL UPPER BACK
LOCATION DETAILED: RIGHT MEDIAL UPPER BACK

## 2018-11-15 ASSESSMENT — LOCATION SIMPLE DESCRIPTION DERM
LOCATION SIMPLE: CHEST
LOCATION SIMPLE: RIGHT UPPER BACK
LOCATION SIMPLE: UPPER BACK
LOCATION SIMPLE: RIGHT UPPER ARM
LOCATION SIMPLE: LEFT UPPER BACK
LOCATION SIMPLE: RIGHT SHOULDER

## 2018-11-15 ASSESSMENT — LOCATION ZONE DERM
LOCATION ZONE: TRUNK
LOCATION ZONE: ARM

## 2018-11-15 NOTE — PROCEDURE: TREATMENT REGIMEN
Plan: Location: Face/Chest/Back\\nDuration: Completed 7 months of Accutane in July 2018\\nPharmacy: [facility]\\nIpledge: [phone]\\n\\nToday, pt states that his skin is doing very well and he has not had any active acne lesions on his face/chest/back\\nPt states that he is very satisfied with the results from Accutane\\nPt has Riax and Evoclin foam to treat folliculitis on the body--- discussed with pt that Riax and Evoclin also function to prevent acne\\n\\nF/u as needed
Detail Level: Zone
Plan: Location: body\\nPrescribe: CONT:  Riax topical foam - apply to body, let sit for several minutes then rinse off in shower\\nCONT: Evoclin foam top qd x 30 days after showering\\n\\nPt is here for f/u and states Riax and Evoclin have been very effective in calming the irritation on his body\\nDiscussed with pt that today, he does not have active acne lesions and his folliculitis has calmed\\n\\nWill continue pt on: \\nRiax topical foam, 1 TOP NEGIN QD--- apply to body, let sit for several minutes then rinse off in shower\\nEvoclin foam top qd x 30 days after showering \\n\\nDiscussed with pt that if folliculitis/acne returns, can restart him on Accutane in the future.\\nReiterated that folliculitis is a common skin condition in which hair follicles become inflamed.\\nDiscussed with pt that this is a form of acne can be caused by genetic or hormonal related factors.\\n\\nF/u as needed\\n
Plan: Location: back, chest, arms **pictures taken**\\nTreatment: 2 cc Fabio 40/ 5 FU\\n\\nPrescribe: hydroxyzine HCl 25 mg tablet--- Take 1 tablet nightly for itch\\nCordran Tape Large Roll 4 mcg/cm2-- Apply to affected areas on body when it is clean and dry.\\n\\nPt presents numerous 's nodules on his body\\nPt states he has a habit of picking and scratching at his skin, especially when he is under high levels of stress\\nDiscussed with pt that his scratching is leading to numerous scabs on his body\\nWill inject inflamed nodules today with Fabio 40/ 5 FU as anti-inflammatory\\n\\nWill prescribe:\\nhydroxyzine HCl 25 mg tablet--- Take 1 tablet nightly for itch\\nCordran Tape Large Roll 4 mcg/cm2-- Apply to affected areas on body when it is clean and dry.\\n\\nDiscussed with pt that hydroxyzine relieves itch and it may help him sleep at night\\nDiscussed with pt that Cordran tape contains a topical steroid which will calm the irritation on his skin--- instructed pt to apply tape over 's nodules when the body is clean and dry. Discussed for pt to let tape fall off on their own once applied. Pt can continue usage of Riax and Evoclin foams for folliculitis while using Cordran Tape.\\n\\nAdvised for pt to stop scratching/picking\\nRecommended Cerave MC/YESSY to re-establish and protect skin barrier\\nInstructed pt to limit soap usage to under arms and private area\\n\\nF/u as needed
Plan: Location: left superior lateral upper back **pictures taken**\\nTreatment: 1 cc Fabio 40/ 5 FU injection\\n\\nPt is here for f/u and states he notices significant improvement with the scar on his back post- injection with Ken40/5 FU last month\\nPt states that the area no longer feels itchy or tender\\nDiscussed with pt that the scar is not as raised and has responded well to treatment with Ken40/ 5FU\\nWill inject today to further flatten scar/ reduce inflammation: Ken40/ 5FU\\n\\nF/u as needed

## 2018-11-15 NOTE — PROCEDURE: INTRALESIONAL KENALOG
X Size Of Lesion In Cm (Optional): 0
Kenalog Preparation: Kenalog with 5-fluorouracil
Medical Necessity Clause: This procedure was medically necessary because the lesions that were treated were:
Detail Level: Detailed
Total Volume Injected (Ccs- Only Use Numbers And Decimals): 2
Include Z78.9 (Other Specified Conditions Influencing Health Status) As An Associated Diagnosis?: No
Consent: The risks of atrophy were reviewed with the patient.
Concentration Of Solution Injected (Mg/Ml): 10.0
Total Volume Injected (Ccs- Only Use Numbers And Decimals): 1

## 2019-01-10 ENCOUNTER — APPOINTMENT (RX ONLY)
Dept: URBAN - METROPOLITAN AREA CLINIC 45 | Facility: CLINIC | Age: 29
Setting detail: DERMATOLOGY
End: 2019-01-10

## 2019-01-10 DIAGNOSIS — L74.51 PRIMARY FOCAL HYPERHIDROSIS: ICD-10-CM

## 2019-01-10 DIAGNOSIS — L70.0 ACNE VULGARIS: ICD-10-CM

## 2019-01-10 DIAGNOSIS — L28.1 PRURIGO NODULARIS: ICD-10-CM

## 2019-01-10 DIAGNOSIS — L85.3 XEROSIS CUTIS: ICD-10-CM

## 2019-01-10 DIAGNOSIS — L738 OTHER SPECIFIED DISEASES OF HAIR AND HAIR FOLLICLES: ICD-10-CM

## 2019-01-10 DIAGNOSIS — L663 OTHER SPECIFIED DISEASES OF HAIR AND HAIR FOLLICLES: ICD-10-CM

## 2019-01-10 DIAGNOSIS — L73.9 FOLLICULAR DISORDER, UNSPECIFIED: ICD-10-CM

## 2019-01-10 DIAGNOSIS — L91.0 HYPERTROPHIC SCAR: ICD-10-CM

## 2019-01-10 PROBLEM — L02.92 FURUNCLE, UNSPECIFIED: Status: ACTIVE | Noted: 2019-01-10

## 2019-01-10 PROBLEM — L74.519 PRIMARY FOCAL HYPERHIDROSIS, UNSPECIFIED: Status: ACTIVE | Noted: 2019-01-10

## 2019-01-10 PROCEDURE — 99213 OFFICE O/P EST LOW 20 MIN: CPT | Mod: 25

## 2019-01-10 PROCEDURE — ? PRESCRIPTION

## 2019-01-10 PROCEDURE — ? COUNSELING

## 2019-01-10 PROCEDURE — ? TREATMENT REGIMEN

## 2019-01-10 PROCEDURE — ? INTRALESIONAL KENALOG

## 2019-01-10 PROCEDURE — 11901 INJECT SKIN LESIONS >7: CPT

## 2019-01-10 RX ORDER — GLYCOPYRRONIUM 2.4 G/100G
CLOTH TOPICAL
Qty: 30 | Refills: 2 | Status: ERX | COMMUNITY
Start: 2019-01-10

## 2019-01-10 RX ADMIN — GLYCOPYRRONIUM: 2.4 CLOTH TOPICAL at 00:00

## 2019-01-10 ASSESSMENT — LOCATION SIMPLE DESCRIPTION DERM
LOCATION SIMPLE: LEFT LIP
LOCATION SIMPLE: RIGHT LOWER BACK
LOCATION SIMPLE: RIGHT SHOULDER
LOCATION SIMPLE: RIGHT UPPER BACK
LOCATION SIMPLE: CHEST
LOCATION SIMPLE: LEFT UPPER BACK
LOCATION SIMPLE: RIGHT LIP

## 2019-01-10 ASSESSMENT — LOCATION DETAILED DESCRIPTION DERM
LOCATION DETAILED: RIGHT SUPERIOR LATERAL MIDBACK
LOCATION DETAILED: RIGHT MID-UPPER BACK
LOCATION DETAILED: RIGHT MEDIAL UPPER BACK
LOCATION DETAILED: RIGHT UPPER CUTANEOUS LIP
LOCATION DETAILED: RIGHT ANTERIOR SHOULDER
LOCATION DETAILED: LEFT UPPER CUTANEOUS LIP
LOCATION DETAILED: RIGHT SUPERIOR LATERAL UPPER BACK
LOCATION DETAILED: LEFT SUPERIOR UPPER BACK
LOCATION DETAILED: LEFT SUPERIOR LATERAL UPPER BACK
LOCATION DETAILED: LEFT LATERAL SUPERIOR CHEST
LOCATION DETAILED: LEFT INFERIOR UPPER BACK

## 2019-01-10 ASSESSMENT — LOCATION ZONE DERM
LOCATION ZONE: ARM
LOCATION ZONE: TRUNK
LOCATION ZONE: LIP

## 2019-01-10 NOTE — PROCEDURE: TREATMENT REGIMEN
Plan: Location: Face/Chest/Back\\nDuration: Completed 7 months of Accutane in July 2018\\n[facility]armacy: [facility]\\nIpledge: [phone]\\n\\nToday, pt states that his skin is doing very well and he has not had any active acne lesions on his face/chest/back\\nPt states that he is very satisfied with the results from Accutane\\nPt has Riax and Evoclin foam to treat folliculitis on the body--- discussed with pt that Riax and Evoclin also function to prevent acne\\n\\nF/u as needed
Detail Level: Zone
Plan: Location: body\\nPrescribe: CONT:  Riax topical foam - apply to body, let sit for several minutes then rinse off in shower\\nCONT: Evoclin foam top qd x 30 days after showering\\n\\nPt is here for f/u and states Riax and Evoclin have been very effective in calming the irritation on his body\\nDiscussed with pt that today, he does not have active acne lesions and his folliculitis has calmed\\n\\nWill continue pt on: \\nRiax topical foam, 1 TOP NEGIN QD--- apply to body, let sit for several minutes then rinse off in shower\\nEvoclin foam top qd x 30 days after showering \\n\\nDiscussed with pt that if folliculitis/acne returns, can restart him on Accutane in the future.\\nReiterated that folliculitis is a common skin condition in which hair follicles become inflamed.\\nDiscussed with pt that this is a form of acne can be caused by genetic or hormonal related factors.\\n\\nF/u as needed\\n
Plan: Location: back, chest, arms **pictures taken**\\nTreatment: 1 cc Fabio 40/ 5 FU\\n\\nPrescribe: hydroxyzine HCl 25 mg tablet--- Take 1 tablet nightly for itch\\nCordran Tape Large Roll 4 mcg/cm2-- Apply to affected areas on body when it is clean and dry.\\n\\nPt presents numerous 's nodules on his body\\nPt states he has a habit of picking and scratching at his skin, especially when he is under high levels of stress\\nDiscussed with pt that his scratching is leading to numerous scabs on his body\\nWill inject inflamed nodules today with Fabio 40/ 5 FU as anti-inflammatory\\n\\nWill prescribe:\\nhydroxyzine HCl 25 mg tablet--- Take 1 tablet nightly for itch\\nCordran Tape Large Roll 4 mcg/cm2-- Apply to affected areas on body when it is clean and dry.\\n\\nDiscussed with pt that hydroxyzine relieves itch and it may help him sleep at night\\nDiscussed with pt that Cordran tape contains a topical steroid which will calm the irritation on his skin--- instructed pt to apply tape over 's nodules when the body is clean and dry. Discussed for pt to let tape fall off on their own once applied. Pt can continue usage of Riax and Evoclin foams for folliculitis while using Cordran Tape.\\n\\nAdvised for pt to stop scratching/picking\\nRecommended Cerave MC/YESSY to re-establish and protect skin barrier\\nInstructed pt to limit soap usage to under arms and private area\\n\\nF/u as needed
Plan: Location: left superior lateral upper back, upper cutaneous lip \\nTreatment: 1 cc Fabio 40/ 5 FU injection on body and 0.5cc 5FU/K10 injection on face \\n\\nPt is here for f/u and states he notices significant improvement with the scar on his back post- injection with Ken40/5 FU last month\\nPt states that the area no longer feels itchy or tender\\nDiscussed with pt that the scar is not as raised and has responded well to treatment with Ken40/ 5FU\\nWill inject today to further flatten scar/ reduce inflammation: Ken40/ 5FU\\nPatient notes he has a scar on his upper cutaneous lip and his nose that he would also like to treat\\nDiscussed with patient we can inject the scar on his upper cutaneous lip with 5FU/K10, but the superficial scars like the one on his nose would have to be treated with laser\\nDiscussed with patient we will refer him to our  [facility] [place] \\n\\nF/u as needed
Plan: Location: armpits\\nPrescribe: Qbrexza — apply to affected areas once daily as needed \\n\\nPatient reports recent excessive sweating in armpits these past couple months\\nDiscussed with patient we can prescribe Qbrexza for patient to use once daily as needed for sweating \\nDiscussed that qbrexa is a glycopyrrholate and might cause dilation of the eyes----So avoid wiping after application\\n\\nF/u in 6 weeks

## 2019-01-10 NOTE — PROCEDURE: INTRALESIONAL KENALOG
Total Volume Injected (Ccs- Only Use Numbers And Decimals): 1
Concentration Of Solution Injected (Mg/Ml): 10.0
X Size Of Lesion In Cm (Optional): 0
Medical Necessity Clause: This procedure was medically necessary because the lesions that were treated were:
Kenalog Preparation: Kenalog with 5-fluorouracil
Detail Level: Detailed
Consent: The risks of atrophy were reviewed with the patient.
Include Z78.9 (Other Specified Conditions Influencing Health Status) As An Associated Diagnosis?: No
Concentration Of Solution Injected (Mg/Ml): 2.5
Kenalog Preparation: Kenalog

## 2019-02-27 ENCOUNTER — APPOINTMENT (RX ONLY)
Dept: URBAN - METROPOLITAN AREA CLINIC 45 | Facility: CLINIC | Age: 29
Setting detail: DERMATOLOGY
End: 2019-02-27

## 2019-02-27 DIAGNOSIS — L28.1 PRURIGO NODULARIS: ICD-10-CM

## 2019-02-27 DIAGNOSIS — L85.3 XEROSIS CUTIS: ICD-10-CM

## 2019-02-27 DIAGNOSIS — L01.01 NON-BULLOUS IMPETIGO: ICD-10-CM

## 2019-02-27 DIAGNOSIS — L738 OTHER SPECIFIED DISEASES OF HAIR AND HAIR FOLLICLES: ICD-10-CM

## 2019-02-27 DIAGNOSIS — L663 OTHER SPECIFIED DISEASES OF HAIR AND HAIR FOLLICLES: ICD-10-CM

## 2019-02-27 DIAGNOSIS — L91.0 HYPERTROPHIC SCAR: ICD-10-CM

## 2019-02-27 DIAGNOSIS — L73.9 FOLLICULAR DISORDER, UNSPECIFIED: ICD-10-CM

## 2019-02-27 DIAGNOSIS — L70.0 ACNE VULGARIS: ICD-10-CM

## 2019-02-27 PROBLEM — L02.92 FURUNCLE, UNSPECIFIED: Status: ACTIVE | Noted: 2019-02-27

## 2019-02-27 PROCEDURE — ? COUNSELING

## 2019-02-27 PROCEDURE — 11901 INJECT SKIN LESIONS >7: CPT

## 2019-02-27 PROCEDURE — ? PRESCRIPTION

## 2019-02-27 PROCEDURE — ? TREATMENT REGIMEN

## 2019-02-27 PROCEDURE — 99213 OFFICE O/P EST LOW 20 MIN: CPT | Mod: 25

## 2019-02-27 PROCEDURE — ? INTRALESIONAL KENALOG

## 2019-02-27 RX ORDER — SULFAMETHOXAZOLE AND TRIMETHOPRIM 800; 160 MG/1; MG/1
TABLET ORAL
Qty: 28 | Refills: 0 | Status: ERX

## 2019-02-27 RX ORDER — CEPHALEXIN 500 MG/1
TABLET ORAL BID
Qty: 28 | Refills: 0 | Status: ERX | COMMUNITY
Start: 2019-02-27

## 2019-02-27 RX ADMIN — CEPHALEXIN: 500 TABLET ORAL at 00:00

## 2019-02-27 ASSESSMENT — LOCATION SIMPLE DESCRIPTION DERM
LOCATION SIMPLE: RIGHT LIP
LOCATION SIMPLE: LEFT LIP
LOCATION SIMPLE: RIGHT UPPER BACK
LOCATION SIMPLE: CHEST
LOCATION SIMPLE: LEFT UPPER BACK
LOCATION SIMPLE: RIGHT LOWER BACK
LOCATION SIMPLE: RIGHT SHOULDER

## 2019-02-27 ASSESSMENT — LOCATION DETAILED DESCRIPTION DERM
LOCATION DETAILED: RIGHT MID-UPPER BACK
LOCATION DETAILED: LEFT SUPERIOR UPPER BACK
LOCATION DETAILED: RIGHT SUPERIOR LATERAL UPPER BACK
LOCATION DETAILED: LEFT SUPERIOR LATERAL UPPER BACK
LOCATION DETAILED: LEFT UPPER CUTANEOUS LIP
LOCATION DETAILED: RIGHT MEDIAL UPPER BACK
LOCATION DETAILED: LEFT INFERIOR UPPER BACK
LOCATION DETAILED: RIGHT ANTERIOR SHOULDER
LOCATION DETAILED: RIGHT SUPERIOR LATERAL MIDBACK
LOCATION DETAILED: LEFT LATERAL SUPERIOR CHEST
LOCATION DETAILED: RIGHT UPPER CUTANEOUS LIP

## 2019-02-27 ASSESSMENT — LOCATION ZONE DERM
LOCATION ZONE: LIP
LOCATION ZONE: ARM
LOCATION ZONE: TRUNK

## 2019-02-27 NOTE — PROCEDURE: INTRALESIONAL KENALOG
Consent: The risks of atrophy were reviewed with the patient.
Kenalog Preparation: Kenalog
Total Volume Injected (Ccs- Only Use Numbers And Decimals): 1
Include Z78.9 (Other Specified Conditions Influencing Health Status) As An Associated Diagnosis?: No
Concentration Of Solution Injected (Mg/Ml): 2.5
Medical Necessity Clause: This procedure was medically necessary because the lesions that were treated were:
X Size Of Lesion In Cm (Optional): 0
Detail Level: Detailed
Kenalog Preparation: Kenalog with 5-fluorouracil
Concentration Of Solution Injected (Mg/Ml): 10.0

## 2019-02-27 NOTE — PROCEDURE: TREATMENT REGIMEN
Detail Level: Zone
Plan: Location: back, chest, arms **pictures taken**\\nTreatment: 1 cc of ILK 40/ 5FU\\n\\nPt is here for f/u.\\nPt presents numerous 's nodules on his body.\\nPt states he has a habit of picking and scratching at his skin, especially when he is under high levels of stress\\nDiscussed with pt that his scratching is leading to numerous scabs on his body\\nWill inject inflamed nodules today with ILK 40/ 5FU as anti-inflammatory\\nDiscussed with pt to continue hydroxyzine to help relieve itch.\\nPt can continue usage of Riax and Evoclin foams for folliculitis while using Cordran Tape.\\nRecommended Cerave MC/YESSY to re-establish and protect skin barrier\\nInstructed pt to limit soap usage to under arms and private area\\n\\nF/u as needed
Plan: Location: left superior lateral upper back, upper cutaneous lip \\nTreatment: 1 cc Fabio 40/ 5 FU injection on body and 0.5cc 5FU/K10 injection on face \\n\\nPt is here for f/u and states he notices significant improvement with the scar on his back post- injection with Ken40/5 FU last month.\\nPt states that the area no longer feels itchy or tender\\nDiscussed with pt that the scar is not as raised and has responded well to treatment with Ken40/ 5FU\\nWill inject today to further flatten scar/ reduce inflammation: Ken40/ 5FU\\nPatient notes he has a scar on his upper cutaneous lip and his nose that he would also like to treat\\nDiscussed with patient we can inject the scar on his upper cutaneous lip with 5FU/K10, but the superficial scars like the one on his nose would have to be treated with laser\\nDiscussed with patient we will refer him to our  [facility] [place] \\n\\nF/u as needed
Plan: Location: body\\nCONT:  Riax topical foam - apply to body, let sit for several minutes then rinse off in shower\\nCONT: Evoclin foam top qd x 30 days after showering\\n\\nWill continue pt on: \\nRiax topical foam, 1 TOP NEGIN QD--- apply to body, let sit for several minutes then rinse off in shower\\nEvoclin foam top qd x 30 days after showering \\n\\nDiscussed with pt that if folliculitis/acne returns, can restart him on Accutane in the future.\\nReiterated that folliculitis is a common skin condition in which hair follicles become inflamed.\\nDiscussed with pt that this is a form of acne can be caused by genetic or hormonal related factors.\\nF/u as needed\\n
Plan: Location: Face/Chest/Back\\nDuration: Completed 7 months of Accutane in July 2018\\n[facility]: [facility]\\nIpledge: [phone]
Plan: Location: Face, back\\nPrescribe: Bactrim  mg-160 mg tablet po (Take one tablet twice a day for 14 days)\\n                 cephalexin 500 mg tablet PO (Take one tablet two times a day for 14 days)\\n\\nPt has irritation around face and body today.\\nPt discussed that it is very itchy and inflamed today.\\nToday there is honey yellow crust developing around the inflammation.\\nDiscussed with pt that this is most likely impetigo.\\nDiscussed with pt that this is a bacterial skin infection that often comes on irritated skin.\\nDiscussed being gentle with the skin---no peroxide or etoh, no neosporin.\\n\\nWill also prescribe pt Bactrim  mg-160 mg tablet po (Take one tablet twice a day for 14 days) and cephalexin 500 mg tablet PO (Take one tablet two times a day for 14 days) to help fight infection.\\nF/u as needed

## 2019-03-27 ENCOUNTER — APPOINTMENT (RX ONLY)
Dept: URBAN - METROPOLITAN AREA CLINIC 45 | Facility: CLINIC | Age: 29
Setting detail: DERMATOLOGY
End: 2019-03-27

## 2019-03-27 DIAGNOSIS — L738 OTHER SPECIFIED DISEASES OF HAIR AND HAIR FOLLICLES: ICD-10-CM

## 2019-03-27 DIAGNOSIS — L85.3 XEROSIS CUTIS: ICD-10-CM

## 2019-03-27 DIAGNOSIS — L73.9 FOLLICULAR DISORDER, UNSPECIFIED: ICD-10-CM

## 2019-03-27 DIAGNOSIS — L663 OTHER SPECIFIED DISEASES OF HAIR AND HAIR FOLLICLES: ICD-10-CM

## 2019-03-27 DIAGNOSIS — L70.0 ACNE VULGARIS: ICD-10-CM

## 2019-03-27 DIAGNOSIS — L91.0 HYPERTROPHIC SCAR: ICD-10-CM

## 2019-03-27 DIAGNOSIS — L28.1 PRURIGO NODULARIS: ICD-10-CM

## 2019-03-27 PROBLEM — L02.92 FURUNCLE, UNSPECIFIED: Status: ACTIVE | Noted: 2019-03-27

## 2019-03-27 PROCEDURE — ? TREATMENT REGIMEN

## 2019-03-27 PROCEDURE — 99213 OFFICE O/P EST LOW 20 MIN: CPT | Mod: 25

## 2019-03-27 PROCEDURE — ? COUNSELING

## 2019-03-27 PROCEDURE — ? INTRALESIONAL KENALOG

## 2019-03-27 ASSESSMENT — LOCATION DETAILED DESCRIPTION DERM
LOCATION DETAILED: RIGHT INFERIOR UPPER BACK
LOCATION DETAILED: LEFT SUPERIOR LATERAL UPPER BACK
LOCATION DETAILED: RIGHT MID-UPPER BACK
LOCATION DETAILED: RIGHT MEDIAL UPPER BACK
LOCATION DETAILED: LEFT LATERAL UPPER BACK

## 2019-03-27 ASSESSMENT — LOCATION SIMPLE DESCRIPTION DERM
LOCATION SIMPLE: RIGHT UPPER BACK
LOCATION SIMPLE: LEFT UPPER BACK

## 2019-03-27 ASSESSMENT — LOCATION ZONE DERM: LOCATION ZONE: TRUNK

## 2019-03-27 NOTE — PROCEDURE: TREATMENT REGIMEN
Plan: Location: back,  **pictures taken**\\nTreatment: 1 cc of ILK 40/ 5FU\\nCONT: Cordran Tape--- apply to affected areas once to twice daily when having flare up to calm irritation.\\n\\nPt is here for f/u and states that overall, his skin is doing very well.\\nToday, he presents some 's nodules on his back.\\nReiterated with pt that  he has a habit of picking and scratching at his skin, especially when he is under high levels of stress leading to numerous scabs and a lot of hyperpigmentation on his body.\\nWill inject the remaining inflamed nodules today with ILK 40/ 5FU as anti-inflammatory\\nDiscussed with pt that he can continue usage of Riax and Evoclin foams for folliculitis while using Cordran Tape.\\nRecommended Cerave MC/HO to re-establish and protect skin barrier\\nInstructed pt to limit soap usage to under arms and private area\\n\\nF/u as needed
Detail Level: Zone
Plan: Location: left superior lateral upper back, upper cutaneous lip ***picture taken***\\n\\nDiscussed with pt that today, the keloid has flattened after several injections of K40/ 5FU.\\nDiscussed with pt that if the keloid becomes bothersome for the him in the future, can inject again with K40/5FU as anti-inflammatory/ to flatten scar.\\n\\nF/u as needed
Plan: Location: body\\nCONT:  Riax topical foam - apply to body, let sit for several minutes then rinse off in shower\\nCONT: Evoclin foam top qd x 30 days after showering\\n\\nWill continue pt on: \\nRiax topical foam, 1 TOP NEGIN QD--- apply to body, let sit for several minutes then rinse off in shower\\nEvoclin foam top qd x 30 days after showering \\n\\nDiscussed with pt that if folliculitis/acne returns, can restart him on Accutane in the future.\\nReiterated that folliculitis is a common skin condition in which hair follicles become inflamed.\\nDiscussed with pt that this is a form of acne can be caused by genetic or hormonal related factors.\\nF/u as needed
Plan: Location: Face/Chest/Back\\nDuration: Completed 7 months of Accutane in July 2018\\n[facility]: [facility]\\nIpledge: [phone]

## 2019-03-27 NOTE — PROCEDURE: INTRALESIONAL KENALOG
Kenalog Preparation: Kenalog
Detail Level: Detailed
Concentration Of Solution Injected (Mg/Ml): 2.5
Medical Necessity Clause: This procedure was medically necessary because the lesions that were treated were:
Total Volume Injected (Ccs- Only Use Numbers And Decimals): 1
Consent: The risks of atrophy were reviewed with the patient.
X Size Of Lesion In Cm (Optional): 0
Include Z78.9 (Other Specified Conditions Influencing Health Status) As An Associated Diagnosis?: No

## 2019-04-04 RX ORDER — BENZOYL PEROXIDE 95 MG/G
AEROSOL, FOAM TOPICAL
Qty: 1 | Refills: 3 | Status: ERX

## 2019-04-08 ENCOUNTER — RX ONLY (OUTPATIENT)
Age: 29
Setting detail: RX ONLY
End: 2019-04-08

## 2019-04-08 RX ORDER — BENZOYL PEROXIDE 95 MG/G
AEROSOL, FOAM TOPICAL
Qty: 1 | Refills: 3 | Status: ERX

## 2019-06-19 RX ORDER — SULFAMETHOXAZOLE AND TRIMETHOPRIM 800; 160 MG/1; MG/1
TABLET ORAL
Qty: 28 | Refills: 0 | Status: ERX

## 2019-06-27 ENCOUNTER — APPOINTMENT (RX ONLY)
Dept: URBAN - METROPOLITAN AREA CLINIC 45 | Facility: CLINIC | Age: 29
Setting detail: DERMATOLOGY
End: 2019-06-27

## 2019-06-27 ENCOUNTER — RX ONLY (OUTPATIENT)
Age: 29
Setting detail: RX ONLY
End: 2019-06-27

## 2019-06-27 DIAGNOSIS — L30.8 OTHER SPECIFIED DERMATITIS: ICD-10-CM

## 2019-06-27 PROBLEM — D37.01 NEOPLASM OF UNCERTAIN BEHAVIOR OF LIP: Status: ACTIVE | Noted: 2019-06-27

## 2019-06-27 PROCEDURE — ? BIOPSY BY SHAVE METHOD

## 2019-06-27 PROCEDURE — 40490 BIOPSY OF LIP: CPT

## 2019-06-27 PROCEDURE — 99213 OFFICE O/P EST LOW 20 MIN: CPT | Mod: 25

## 2019-06-27 PROCEDURE — ? TREATMENT REGIMEN

## 2019-06-27 PROCEDURE — ? COUNSELING

## 2019-06-27 PROCEDURE — ? PRESCRIPTION

## 2019-06-27 RX ORDER — CLOBETASOL PROPIONATE 0.5 MG/G
AEROSOL, FOAM TOPICAL
Qty: 1 | Refills: 3 | Status: ERX | COMMUNITY
Start: 2019-06-27

## 2019-06-27 RX ORDER — CLINDAMYCIN PHOSPHATE 10 MG/G
AEROSOL, FOAM TOPICAL
Qty: 1 | Refills: 4 | Status: ERX

## 2019-06-27 RX ORDER — BENZOYL PEROXIDE 95 MG/G
AEROSOL, FOAM TOPICAL
Qty: 1 | Refills: 4 | Status: ERX

## 2019-06-27 RX ADMIN — CLOBETASOL PROPIONATE: 0.5 AEROSOL, FOAM TOPICAL at 00:00

## 2019-06-27 NOTE — PROCEDURE: TREATMENT REGIMEN
Detail Level: Zone
Plan: Location:\\nPrescribe: Olux- E Foam top qd x 30 days\\n\\nDiscussed with patient that dryness, cracked irritation is an eczematous condition,\\nThis type of eczema is usually both genetic and due to trigger factors like stress and chronic hand washing. \\nWe will calm down inflammation by starting pt on a potent topical steroid, Olux-E foam to help alleviate inflammation.\\n\\nDiscussed with pt that after applying steroid, apply Cerave MC to help reestablish a healthy skin barrier during the day and at night we want pt to use Cerave Healing Ointment.\\nAdvised pt to limit hand washing, avoid using hand  to help prevent dryness.\\nF/u as needed

## 2019-06-27 NOTE — PROCEDURE: BIOPSY BY SHAVE METHOD
Electrodesiccation Text: The wound bed was treated with electrodesiccation after the biopsy was performed.
Bill For Surgical Tray: no
Wound Care: Petrolatum
Notification Instructions: Patient will be notified of biopsy results. However, patient instructed to call the office if not contacted within 2 weeks.
Biopsy Type: H and E
X Size Of Lesion In Cm: 0
Consent: Written consent was obtained and risks were reviewed including but not limited to scarring, infection, bleeding, scabbing, incomplete removal, nerve damage and allergy to anesthesia.
Depth Of Biopsy: dermis
Electrodesiccation And Curettage Text: The wound bed was treated with electrodesiccation and curettage after the biopsy was performed.
Type Of Destruction Used: Curettage
Anesthesia Volume In Cc (Will Not Render If 0): 0.5
Body Location Override (Optional - Billing Will Still Be Based On Selected Body Map Location If Applicable): lower lip
Dressing: bandage
Was A Bandage Applied: Yes
Hemostasis: Drysol
Silver Nitrate Text: The wound bed was treated with silver nitrate after the biopsy was performed.
Lab: [id number]
Curettage Text: The wound bed was treated with curettage after the biopsy was performed.
Billing Type: Third-Party Bill
Detail Level: Simple
Biopsy Method: Dermablade
Post-Care Instructions: I reviewed with the patient in detail post-care instructions. Patient is to keep the biopsy site dry overnight, and then apply bacitracin twice daily until healed. Patient may apply hydrogen peroxide soaks to remove any crusting.
Lab Facility: [id number]
Cryotherapy Text: The wound bed was treated with cryotherapy after the biopsy was performed.
Anesthesia Type: 1% lidocaine with epinephrine

## 2020-02-27 ENCOUNTER — RX ONLY (OUTPATIENT)
Age: 30
Setting detail: RX ONLY
End: 2020-02-27

## 2020-02-27 ENCOUNTER — APPOINTMENT (RX ONLY)
Dept: URBAN - METROPOLITAN AREA CLINIC 45 | Facility: CLINIC | Age: 30
Setting detail: DERMATOLOGY
End: 2020-02-27

## 2020-02-27 DIAGNOSIS — L28.1 PRURIGO NODULARIS: ICD-10-CM

## 2020-02-27 DIAGNOSIS — L738 OTHER SPECIFIED DISEASES OF HAIR AND HAIR FOLLICLES: ICD-10-CM

## 2020-02-27 DIAGNOSIS — L82.0 INFLAMED SEBORRHEIC KERATOSIS: ICD-10-CM

## 2020-02-27 DIAGNOSIS — L663 OTHER SPECIFIED DISEASES OF HAIR AND HAIR FOLLICLES: ICD-10-CM

## 2020-02-27 DIAGNOSIS — L73.9 FOLLICULAR DISORDER, UNSPECIFIED: ICD-10-CM

## 2020-02-27 PROBLEM — L02.92 FURUNCLE, UNSPECIFIED: Status: ACTIVE | Noted: 2020-02-27

## 2020-02-27 PROCEDURE — ? COUNSELING

## 2020-02-27 PROCEDURE — ? LIQUID NITROGEN

## 2020-02-27 PROCEDURE — 99213 OFFICE O/P EST LOW 20 MIN: CPT | Mod: 25

## 2020-02-27 PROCEDURE — 17110 DESTRUCTION B9 LES UP TO 14: CPT

## 2020-02-27 PROCEDURE — ? TREATMENT REGIMEN

## 2020-02-27 RX ORDER — UREA 410 MG/G
CREAM TOPICAL
Qty: 1 | Refills: 4 | Status: ERX | COMMUNITY
Start: 2020-02-27

## 2020-02-27 RX ORDER — SULFACETAMIDE SODIUM, SULFUR 98; 48 MG/G; MG/G
LIQUID TOPICAL
Qty: 1 | Refills: 4 | Status: ERX | COMMUNITY
Start: 2020-02-27

## 2020-02-27 RX ORDER — GLYCOPYRRONIUM 2.4 G/100G
CLOTH TOPICAL
Qty: 30 | Refills: 2 | Status: ERX

## 2020-02-27 RX ORDER — CLINDAMYCIN PHOSPHATE 10 MG/G
AEROSOL, FOAM TOPICAL
Qty: 1 | Refills: 4 | Status: ERX

## 2020-02-27 RX ORDER — CLINDAMYCIN PHOSPHATE 10 MG/G
AEROSOL, FOAM TOPICAL
Qty: 1 | Refills: 4 | Status: CANCELLED
Stop reason: CLARIF

## 2020-02-27 RX ORDER — BENZOYL PEROXIDE 95 MG/G
AEROSOL, FOAM TOPICAL
Qty: 1 | Refills: 4 | Status: CANCELLED
Stop reason: CLARIF

## 2020-02-27 RX ORDER — CLINDAMYCIN PHOSPHATE 1 %
KIT TOPICAL
Qty: 1 | Refills: 4 | Status: ERX | COMMUNITY
Start: 2020-02-27

## 2020-02-27 ASSESSMENT — LOCATION DETAILED DESCRIPTION DERM: LOCATION DETAILED: LEFT INFERIOR VERMILION LIP

## 2020-02-27 ASSESSMENT — LOCATION SIMPLE DESCRIPTION DERM: LOCATION SIMPLE: LEFT LIP

## 2020-02-27 ASSESSMENT — LOCATION ZONE DERM: LOCATION ZONE: LIP

## 2020-02-27 NOTE — PROCEDURE: TREATMENT REGIMEN
Plan: Location: back,  **pictures taken**\\nTreatment: CONT: Cordran Tape--- apply to affected areas once to twice daily when having flare up to calm irritation.\\n\\nPt is here for f/u and states that overall, his skin is doing very well.\\nToday, he does not present any 's nodules on his back.\\nReiterated with patient that he has a habit of picking and scratching at his skin, especially when he is under high levels of stress leading to numerous scabs and a lot of hyperpigmentation on his body.\\nDiscussed with pt that he can continue usage of Riax and Evoclin foams for folliculitis while using Cordran Tape.\\nRecommended Cerave MC/YESSY to re-establish and protect skin barrier\\nInstructed pt to limit soap usage to under arms and private area\\n\\nF/u as needed
Detail Level: Zone
Plan: Location: body\\nCONT:  Riax topical foam - apply to body, let sit for several minutes then rinse off in shower\\nCONT: Evoclin foam top qd x 30 days after showering\\n\\nWill continue pt on: \\nRiax topical foam, 1 TOP NEGIN QD--- apply to body, let sit for several minutes then rinse off in shower\\nEvoclin foam top qd x 30 days after showering \\n\\nDiscussed with pt that if folliculitis/acne returns, can restart him on Accutane in the future.\\nReiterated that folliculitis is a common skin condition in which hair follicles become inflamed.\\nDiscussed with pt that this is a form of acne can be caused by genetic or hormonal related factors.\\nF/u as needed

## 2020-02-27 NOTE — PROCEDURE: LIQUID NITROGEN

## 2020-05-13 ENCOUNTER — RX ONLY (OUTPATIENT)
Age: 30
Setting detail: RX ONLY
End: 2020-05-13

## 2020-05-13 ENCOUNTER — APPOINTMENT (RX ONLY)
Dept: URBAN - METROPOLITAN AREA CLINIC 45 | Facility: CLINIC | Age: 30
Setting detail: DERMATOLOGY
End: 2020-05-13

## 2020-05-13 DIAGNOSIS — L73.9 FOLLICULAR DISORDER, UNSPECIFIED: ICD-10-CM

## 2020-05-13 DIAGNOSIS — L82.0 INFLAMED SEBORRHEIC KERATOSIS: ICD-10-CM

## 2020-05-13 DIAGNOSIS — L663 OTHER SPECIFIED DISEASES OF HAIR AND HAIR FOLLICLES: ICD-10-CM

## 2020-05-13 DIAGNOSIS — L738 OTHER SPECIFIED DISEASES OF HAIR AND HAIR FOLLICLES: ICD-10-CM

## 2020-05-13 DIAGNOSIS — L74.51 PRIMARY FOCAL HYPERHIDROSIS: ICD-10-CM

## 2020-05-13 PROBLEM — L02.222 FURUNCLE OF BACK [ANY PART, EXCEPT BUTTOCK]: Status: ACTIVE | Noted: 2020-05-13

## 2020-05-13 PROBLEM — L74.510 PRIMARY FOCAL HYPERHIDROSIS, AXILLA: Status: ACTIVE | Noted: 2020-05-13

## 2020-05-13 PROCEDURE — ? COUNSELING

## 2020-05-13 PROCEDURE — 17110 DESTRUCTION B9 LES UP TO 14: CPT

## 2020-05-13 PROCEDURE — ? PRESCRIPTION

## 2020-05-13 PROCEDURE — ? LIQUID NITROGEN

## 2020-05-13 PROCEDURE — 99213 OFFICE O/P EST LOW 20 MIN: CPT | Mod: 25

## 2020-05-13 PROCEDURE — ? TREATMENT REGIMEN

## 2020-05-13 RX ORDER — ALUMINUM CHLORIDE 20 %
SOLUTION, NON-ORAL TOPICAL QHS
Qty: 1 | Refills: 3 | Status: ERX | COMMUNITY
Start: 2020-05-13

## 2020-05-13 RX ORDER — CLINDAMYCIN PHOSPHATE 1 %
KIT TOPICAL
Qty: 1 | Refills: 4 | Status: ERX

## 2020-05-13 RX ORDER — CLINDAMYCIN PHOSPHATE 10 MG/G
AEROSOL, FOAM TOPICAL
Qty: 1 | Refills: 4 | Status: ERX

## 2020-05-13 RX ORDER — BENZOYL PEROXIDE 95 MG/G
AEROSOL, FOAM TOPICAL
Qty: 1 | Refills: 4 | Status: ERX

## 2020-05-13 RX ORDER — SULFACETAMIDE SODIUM, SULFUR 98; 48 MG/G; MG/G
LIQUID TOPICAL
Qty: 1 | Refills: 4 | Status: ERX

## 2020-05-13 RX ORDER — UREA 410 MG/G
CREAM TOPICAL
Qty: 1 | Refills: 4 | Status: ERX

## 2020-05-13 RX ADMIN — Medication: at 00:00

## 2020-05-13 ASSESSMENT — LOCATION ZONE DERM
LOCATION ZONE: AXILLAE
LOCATION ZONE: TRUNK
LOCATION ZONE: LIP

## 2020-05-13 ASSESSMENT — LOCATION DETAILED DESCRIPTION DERM
LOCATION DETAILED: LEFT AXILLARY VAULT
LOCATION DETAILED: RIGHT AXILLARY VAULT
LOCATION DETAILED: LEFT MID-UPPER BACK
LOCATION DETAILED: LEFT INFERIOR VERMILION LIP
LOCATION DETAILED: RIGHT MID-UPPER BACK

## 2020-05-13 ASSESSMENT — PAIN INTENSITY VAS: HOW INTENSE IS YOUR PAIN 0 BEING NO PAIN, 10 BEING THE MOST SEVERE PAIN POSSIBLE?: NO PAIN

## 2020-05-13 ASSESSMENT — LOCATION SIMPLE DESCRIPTION DERM
LOCATION SIMPLE: LEFT UPPER BACK
LOCATION SIMPLE: LEFT AXILLARY VAULT
LOCATION SIMPLE: RIGHT AXILLARY VAULT
LOCATION SIMPLE: RIGHT UPPER BACK
LOCATION SIMPLE: LEFT LIP

## 2020-05-13 ASSESSMENT — SEVERITY ASSESSMENT: SEVERITY: MILD

## 2020-05-13 NOTE — PROCEDURE: TREATMENT REGIMEN
Detail Level: Zone
Plan: Location: body\\nCONT:  Riax topical foam - apply to body, let sit for several minutes then rinse off in shower\\nCONT: Evoclin foam top qd x 30 days after showering\\nRefilled: Clindacin ETZ wipes, plexion cleanser \\n\\nWill continue pt on: \\nRiax topical foam, 1 TOP NEGIN QD--- apply to body, let sit for several minutes then rinse off in shower\\nEvoclin foam top qd x 30 days after showering\\nRefill: Clindacin ETZ wipes and plexion sulfa cleanser \\n\\nPatient finds his topical regimen very effective and is requesting a refill on his medications \\nFolliculitis has been well under control and is content with results \\nWill refill today
Plan: Location: inferior lip\\nTreatment: LN2\\n\\nPatient is here for a follow up after having cryotherapy treatment and using Cerave MC with salicylic acid\\nPatient states that the lesion has continued to improve and flatten down however he still finds himself picking at it\\nToday will continue treating growth with LN2 and instructed to continue applying the Cerave MC with salicylic acid\\nFollow up: PRN
Plan: Location: armpits\\nPrescribe: Drysol \\n—Qbrexza was not covered by ins—-\\n\\nPatient informed me that Qbrexza was not covered by his insurance and would like to see if there’s an alternative \\nWill have patient try Drysol solution to help with excessive sweating

## 2020-05-13 NOTE — PROCEDURE: LIQUID NITROGEN
Number Of Freeze-Thaw Cycles: 3 freeze-thaw cycles
Include Z78.9 (Other Specified Conditions Influencing Health Status) As An Associated Diagnosis?: No
Detail Level: Detailed
Post-Care Instructions: I reviewed with the patient in detail post-care instructions. Patient is to wear sunprotection, and avoid picking at any of the treated lesions. Pt may apply Vaseline to crusted or scabbing areas.
Consent: The patient's consent was obtained including but not limited to risks of crusting, scabbing, blistering, scarring, darker or lighter pigmentary change, recurrence, incomplete removal and infection.
Medical Necessity Clause: This procedure was medically necessary because the lesions that were treated were:
Medical Necessity Information: It is in your best interest to select a reason for this procedure from the list below. All of these items fulfill various CMS LCD requirements except the new and changing color options.

## 2020-11-05 ENCOUNTER — APPOINTMENT (RX ONLY)
Dept: URBAN - METROPOLITAN AREA CLINIC 45 | Facility: CLINIC | Age: 30
Setting detail: DERMATOLOGY
End: 2020-11-05

## 2020-11-05 DIAGNOSIS — L738 OTHER SPECIFIED DISEASES OF HAIR AND HAIR FOLLICLES: ICD-10-CM

## 2020-11-05 DIAGNOSIS — L663 OTHER SPECIFIED DISEASES OF HAIR AND HAIR FOLLICLES: ICD-10-CM

## 2020-11-05 DIAGNOSIS — L73.9 FOLLICULAR DISORDER, UNSPECIFIED: ICD-10-CM

## 2020-11-05 DIAGNOSIS — L82.0 INFLAMED SEBORRHEIC KERATOSIS: ICD-10-CM

## 2020-11-05 PROBLEM — L02.222 FURUNCLE OF BACK [ANY PART, EXCEPT BUTTOCK]: Status: ACTIVE | Noted: 2020-11-05

## 2020-11-05 PROCEDURE — 17110 DESTRUCTION B9 LES UP TO 14: CPT

## 2020-11-05 PROCEDURE — ? COUNSELING

## 2020-11-05 PROCEDURE — ? LIQUID NITROGEN

## 2020-11-05 PROCEDURE — ? TREATMENT REGIMEN

## 2020-11-05 PROCEDURE — 99213 OFFICE O/P EST LOW 20 MIN: CPT | Mod: 25

## 2020-11-05 ASSESSMENT — LOCATION SIMPLE DESCRIPTION DERM
LOCATION SIMPLE: LEFT LIP
LOCATION SIMPLE: RIGHT UPPER BACK
LOCATION SIMPLE: LEFT UPPER BACK

## 2020-11-05 ASSESSMENT — LOCATION ZONE DERM
LOCATION ZONE: LIP
LOCATION ZONE: TRUNK

## 2020-11-05 ASSESSMENT — LOCATION DETAILED DESCRIPTION DERM
LOCATION DETAILED: LEFT INFERIOR VERMILION LIP
LOCATION DETAILED: LEFT MID-UPPER BACK
LOCATION DETAILED: RIGHT MID-UPPER BACK

## 2020-11-05 NOTE — PROCEDURE: TREATMENT REGIMEN
Detail Level: Zone
Plan: Location: body\\nCONT:  Riax topical foam - apply to body, let sit for several minutes then rinse off in shower\\nCONT: Evoclin foam top qd x 30 days after showering\\nRefilled: Clindacin ETZ wipes, plexion cleanser \\n\\n\\n11/05/2020\\n\\nPatient is here for a follow up on acne \\nPatient has been using Riax topical foam, Evoclin topical foam and Clindacin ETZ and Plexion topical cleanser daily and has noticed a significant amount of improvement.\\nPatient finds his topical regimen very effective and is requesting a refill on his medications \\nFolliculitis has been well under control and is content with results \\nDiscussed with the patient he is to continue with current regimen, will refill all prescriptions at today's visit.\\n\\nPatient is to follow up in 6 months. \\n---------------------------------------------------------------------------------------\\nWill continue pt on: \\nRiax topical foam, 1 TOP NEGIN QD--- apply to body, let sit for several minutes then rinse off in shower\\nEvoclin foam top qd x 30 days after showering\\nRefill: Clindacin ETZ wipes and plexion sulfa cleanser \\n\\nPatient finds his topical regimen very effective and is requesting a refill on his medications \\nFolliculitis has been well under control and is content with results \\nWill refill today
Plan: Location: inferior lip\\nTreatment: LN2\\n\\nPatient is here for a follow up after having cryotherapy treatment and using Cerave MC with salicylic acid\\nPatient states that the lesion has continued to improve and flatten down however he still finds himself picking at it\\nToday will continue treating growth with LN2 and instructed to continue applying the Cerave MC with salicylic acid\\nFollow up: PRN

## 2020-11-06 ENCOUNTER — RX ONLY (OUTPATIENT)
Age: 30
Setting detail: RX ONLY
End: 2020-11-06

## 2020-11-06 RX ORDER — CLINDAMYCIN PHOSPHATE 10 MG/G
AEROSOL, FOAM TOPICAL
Qty: 1 | Refills: 4 | Status: ERX

## 2020-11-06 RX ORDER — CLINDAMYCIN PHOSPHATE 1 %
KIT TOPICAL
Qty: 1 | Refills: 4 | Status: ERX

## 2020-11-06 RX ORDER — SULFACETAMIDE SODIUM, SULFUR 98; 48 MG/G; MG/G
LIQUID TOPICAL
Qty: 1 | Refills: 4 | Status: ERX

## 2020-11-06 RX ORDER — BENZOYL PEROXIDE 95 MG/G
AEROSOL, FOAM TOPICAL
Qty: 1 | Refills: 4 | Status: ERX

## 2021-01-27 ENCOUNTER — APPOINTMENT (RX ONLY)
Dept: URBAN - METROPOLITAN AREA CLINIC 45 | Facility: CLINIC | Age: 31
Setting detail: DERMATOLOGY
End: 2021-01-27

## 2021-01-27 DIAGNOSIS — L70.0 ACNE VULGARIS: ICD-10-CM

## 2021-01-27 DIAGNOSIS — L01.01 NON-BULLOUS IMPETIGO: ICD-10-CM

## 2021-01-27 PROCEDURE — ? COUNSELING

## 2021-01-27 PROCEDURE — ? PRESCRIPTION

## 2021-01-27 PROCEDURE — ? TREATMENT REGIMEN

## 2021-01-27 PROCEDURE — 99213 OFFICE O/P EST LOW 20 MIN: CPT

## 2021-01-27 RX ORDER — MINOCYCLINE HYDROCHLORIDE 135 MG/1
CAPSULE, EXTENDED RELEASE ORAL
Qty: 30 | Refills: 2 | Status: ERX | COMMUNITY
Start: 2021-01-27

## 2021-01-27 RX ADMIN — MINOCYCLINE HYDROCHLORIDE: 135 CAPSULE, EXTENDED RELEASE ORAL at 00:00

## 2021-01-27 ASSESSMENT — LOCATION SIMPLE DESCRIPTION DERM
LOCATION SIMPLE: CHEST
LOCATION SIMPLE: INFERIOR FOREHEAD

## 2021-01-27 ASSESSMENT — LOCATION DETAILED DESCRIPTION DERM
LOCATION DETAILED: INFERIOR MID FOREHEAD
LOCATION DETAILED: RIGHT MEDIAL SUPERIOR CHEST

## 2021-01-27 ASSESSMENT — LOCATION ZONE DERM
LOCATION ZONE: FACE
LOCATION ZONE: TRUNK

## 2021-01-27 NOTE — PROCEDURE: TREATMENT REGIMEN
Detail Level: Zone
Plan: Location: Face\\nPrescription: Ximino 135mg\\n\\nPt comes in today for a follow up on acne.\\nPt states that his acne has calmed down since starting treatment regimen.\\nHe mentions that he uses the clindamycin etz wipes as instructed when he feels a break out coming.\\nHe does however find the hyperpigmentation that’s being left behind bothersome.\\n\\nDiscussed with it today that upon further examination his skin has improved.\\nToday,I will have to continue with current treatment regimen along with adding a new rx.\\nDiscussed with pt that we will prescribe Ximino 135mg and Plexion Wash (2-5 minutes) daily to help reduce inflammation.\\nDiscussed with patient that this is an immune mediated condition triggered with stress, lack of sleep, and also has a major genetic component\\nEducated patient on Accutane 6 month treatment course, side effects, and iPledge program/requirements (2 negative pregnancy tests 30 days apart required)\\nCommon side effects from therapy: dryness, joint pain, mood changes, headaches, nose bleeds\\nDiscussed with patient that Accutane is a Vitamin A derivative\\nDiscussed with patient that Accutane obliterates oil glands and a cure for acne vs. antibiotics which is a temporary treatment\\nDiscussed with patient that medication is processed by the liver and requires blood work to monitor liver functions\\n\\n\\n\\nWill f/u in prn
Plan: Location:forehead \\n\\n\\nPt has irritation around forehead today.\\nPt discussed that it is very itchy and inflamed today.\\nToday there is honey yellow crust developing around the inflammation.\\nDiscussed with pt that this is most likely impetigo.\\nDiscussed with pt that this is a bacterial skin infection that often comes on irritated skin.\\nDiscussed with pt to start using cln body wash twice weekly to help prevent recurrence of infection.\\nWill culture today, which will allows us to start him on the best possible antibiotic.\\nDiscussed being gentle with the skin---no peroxide or etoh, no neosporin.\\n\\n\\nF/u as needed

## 2021-01-27 NOTE — PROCEDURE: COUNSELING
Erythromycin Pregnancy And Lactation Text: This medication is Pregnancy Category B and is considered safe during pregnancy. It is also excreted in breast milk.
Include Pregnancy/Lactation Warning?: No
Minocycline Counseling: Patient advised regarding possible photosensitivity and discoloration of the teeth, skin, lips, tongue and gums.  Patient instructed to avoid sunlight, if possible.  When exposed to sunlight, patients should wear protective clothing, sunglasses, and sunscreen.  The patient was instructed to call the office immediately if the following severe adverse effects occur:  hearing changes, easy bruising/bleeding, severe headache, or vision changes.  The patient verbalized understanding of the proper use and possible adverse effects of minocycline.  All of the patient's questions and concerns were addressed.
Azithromycin Pregnancy And Lactation Text: This medication is considered safe during pregnancy and is also secreted in breast milk.
Dapsone Counseling: I discussed with the patient the risks of dapsone including but not limited to hemolytic anemia, agranulocytosis, rashes, methemoglobinemia, kidney failure, peripheral neuropathy, headaches, GI upset, and liver toxicity.  Patients who start dapsone require monitoring including baseline LFTs and weekly CBCs for the first month, then every month thereafter.  The patient verbalized understanding of the proper use and possible adverse effects of dapsone.  All of the patient's questions and concerns were addressed.
Topical Retinoid Pregnancy And Lactation Text: This medication is Pregnancy Category C. It is unknown if this medication is excreted in breast milk.
Isotretinoin Counseling: Patient should get monthly blood tests, not donate blood, not drive at night if vision affected, not share medication, and not undergo elective surgery for 6 months after tx completed. Side effects reviewed, pt to contact office should one occur.
Topical Clindamycin Pregnancy And Lactation Text: This medication is Pregnancy Category B and is considered safe during pregnancy. It is unknown if it is excreted in breast milk.
Topical Sulfur Applications Counseling: Topical Sulfur Counseling: Patient counseled that this medication may cause skin irritation or allergic reactions.  In the event of skin irritation, the patient was advised to reduce the amount of the drug applied or use it less frequently.   The patient verbalized understanding of the proper use and possible adverse effects of topical sulfur application.  All of the patient's questions and concerns were addressed.
Dapsone Pregnancy And Lactation Text: This medication is Pregnancy Category C and is not considered safe during pregnancy or breast feeding.
Spironolactone Pregnancy And Lactation Text: This medication can cause feminization of the male fetus and should be avoided during pregnancy. The active metabolite is also found in breast milk.
Tetracycline Pregnancy And Lactation Text: This medication is Pregnancy Category D and not consider safe during pregnancy. It is also excreted in breast milk.
Doxycycline Counseling:  Patient counseled regarding possible photosensitivity and increased risk for sunburn.  Patient instructed to avoid sunlight, if possible.  When exposed to sunlight, patients should wear protective clothing, sunglasses, and sunscreen.  The patient was instructed to call the office immediately if the following severe adverse effects occur:  hearing changes, easy bruising/bleeding, severe headache, or vision changes.  The patient verbalized understanding of the proper use and possible adverse effects of doxycycline.  All of the patient's questions and concerns were addressed.
Azithromycin Counseling:  I discussed with the patient the risks of azithromycin including but not limited to GI upset, allergic reaction, drug rash, diarrhea, and yeast infections.
Bactrim Pregnancy And Lactation Text: This medication is Pregnancy Category D and is known to cause fetal risk.  It is also excreted in breast milk.
Tazorac Counseling:  Patient advised that medication is irritating and drying.  Patient may need to apply sparingly and wash off after an hour before eventually leaving it on overnight.  The patient verbalized understanding of the proper use and possible adverse effects of tazorac.  All of the patient's questions and concerns were addressed.
Tetracycline Counseling: Patient counseled regarding possible photosensitivity and increased risk for sunburn.  Patient instructed to avoid sunlight, if possible.  When exposed to sunlight, patients should wear protective clothing, sunglasses, and sunscreen.  The patient was instructed to call the office immediately if the following severe adverse effects occur:  hearing changes, easy bruising/bleeding, severe headache, or vision changes.  The patient verbalized understanding of the proper use and possible adverse effects of tetracycline.  All of the patient's questions and concerns were addressed. Patient understands to avoid pregnancy while on therapy due to potential birth defects.
Spironolactone Counseling: Patient advised regarding risks of diarrhea, abdominal pain, hyperkalemia, birth defects (for female patients), liver toxicity and renal toxicity. The patient may need blood work to monitor liver and kidney function and potassium levels while on therapy. The patient verbalized understanding of the proper use and possible adverse effects of spironolactone.  All of the patient's questions and concerns were addressed.
Topical Sulfur Applications Pregnancy And Lactation Text: This medication is Pregnancy Category C and has an unknown safety profile during pregnancy. It is unknown if this topical medication is excreted in breast milk.
High Dose Vitamin A Pregnancy And Lactation Text: High dose vitamin A therapy is contraindicated during pregnancy and breast feeding.
Tazorac Pregnancy And Lactation Text: This medication is not safe during pregnancy. It is unknown if this medication is excreted in breast milk.
Isotretinoin Pregnancy And Lactation Text: This medication is Pregnancy Category X and is considered extremely dangerous during pregnancy. It is unknown if it is excreted in breast milk.
Detail Level: Zone
Benzoyl Peroxide Counseling: Patient counseled that medicine may cause skin irritation and bleach clothing.  In the event of skin irritation, the patient was advised to reduce the amount of the drug applied or use it less frequently.   The patient verbalized understanding of the proper use and possible adverse effects of benzoyl peroxide.  All of the patient's questions and concerns were addressed.
Doxycycline Pregnancy And Lactation Text: This medication is Pregnancy Category D and not consider safe during pregnancy. It is also excreted in breast milk but is considered safe for shorter treatment courses.
Topical Clindamycin Counseling: Patient counseled that this medication may cause skin irritation or allergic reactions.  In the event of skin irritation, the patient was advised to reduce the amount of the drug applied or use it less frequently.   The patient verbalized understanding of the proper use and possible adverse effects of clindamycin.  All of the patient's questions and concerns were addressed.
Erythromycin Counseling:  I discussed with the patient the risks of erythromycin including but not limited to GI upset, allergic reaction, drug rash, diarrhea, increase in liver enzymes, and yeast infections.
Birth Control Pills Pregnancy And Lactation Text: This medication should be avoided if pregnant and for the first 30 days post-partum.
Topical Retinoid counseling:  Patient advised to apply a pea-sized amount only at bedtime and wait 30 minutes after washing their face before applying.  If too drying, patient may add a non-comedogenic moisturizer. The patient verbalized understanding of the proper use and possible adverse effects of retinoids.  All of the patient's questions and concerns were addressed.
High Dose Vitamin A Counseling: Side effects reviewed, pt to contact office should one occur.
Benzoyl Peroxide Pregnancy And Lactation Text: This medication is Pregnancy Category C. It is unknown if benzoyl peroxide is excreted in breast milk.
Birth Control Pills Counseling: Birth Control Pill Counseling: I discussed with the patient the potential side effects of OCPs including but not limited to increased risk of stroke, heart attack, thrombophlebitis, deep venous thrombosis, hepatic adenomas, breast changes, GI upset, headaches, and depression.  The patient verbalized understanding of the proper use and possible adverse effects of OCPs. All of the patient's questions and concerns were addressed.
Bactrim Counseling:  I discussed with the patient the risks of sulfa antibiotics including but not limited to GI upset, allergic reaction, drug rash, diarrhea, dizziness, photosensitivity, and yeast infections.  Rarely, more serious reactions can occur including but not limited to aplastic anemia, agranulocytosis, methemoglobinemia, blood dyscrasias, liver or kidney failure, lung infiltrates or desquamative/blistering drug rashes.
Detail Level: Detailed

## 2021-03-17 ENCOUNTER — APPOINTMENT (RX ONLY)
Dept: URBAN - METROPOLITAN AREA CLINIC 45 | Facility: CLINIC | Age: 31
Setting detail: DERMATOLOGY
End: 2021-03-17

## 2021-03-17 DIAGNOSIS — B07.8 OTHER VIRAL WARTS: ICD-10-CM

## 2021-03-17 DIAGNOSIS — B35.8 OTHER DERMATOPHYTOSES: ICD-10-CM

## 2021-03-17 PROCEDURE — ? TREATMENT REGIMEN

## 2021-03-17 PROCEDURE — 99213 OFFICE O/P EST LOW 20 MIN: CPT | Mod: 25

## 2021-03-17 PROCEDURE — ? PRESCRIPTION

## 2021-03-17 PROCEDURE — 17110 DESTRUCTION B9 LES UP TO 14: CPT

## 2021-03-17 PROCEDURE — ? LIQUID NITROGEN

## 2021-03-17 PROCEDURE — ? COUNSELING

## 2021-03-17 RX ORDER — NAFTIFINE HYDROCHLORIDE 2 G/100G
GEL TOPICAL
Qty: 1 | Refills: 2 | Status: ERX | COMMUNITY
Start: 2021-03-17

## 2021-03-17 RX ORDER — SULFAMETHOXAZOLE AND TRIMETHOPRIM 400; 80 MG/1; MG/1
TABLET ORAL
Qty: 7 | Refills: 0 | Status: ERX | COMMUNITY
Start: 2021-03-17

## 2021-03-17 RX ADMIN — NAFTIFINE HYDROCHLORIDE: 2 GEL TOPICAL at 00:00

## 2021-03-17 RX ADMIN — SULFAMETHOXAZOLE AND TRIMETHOPRIM: 400; 80 TABLET ORAL at 00:00

## 2021-03-17 ASSESSMENT — LOCATION SIMPLE DESCRIPTION DERM
LOCATION SIMPLE: LEFT PLANTAR SURFACE
LOCATION SIMPLE: PENIS
LOCATION SIMPLE: RIGHT PLANTAR SURFACE
LOCATION SIMPLE: LEFT LIP

## 2021-03-17 ASSESSMENT — LOCATION DETAILED DESCRIPTION DERM
LOCATION DETAILED: RIGHT PLANTAR FOREFOOT OVERLYING 2ND METATARSAL
LOCATION DETAILED: LEFT INFERIOR VERMILION LIP
LOCATION DETAILED: LEFT PLANTAR FOREFOOT OVERLYING 1ST METATARSAL
LOCATION DETAILED: RIGHT DORSAL SHAFT OF PENIS

## 2021-03-17 ASSESSMENT — LOCATION ZONE DERM
LOCATION ZONE: PENIS
LOCATION ZONE: LIP
LOCATION ZONE: FEET

## 2021-03-17 NOTE — PROCEDURE: TREATMENT REGIMEN
Detail Level: Zone
Plan: Location: groin,Lip\\nTreatment: LN2\\n\\n\\nDiscussed with patient that they are a common HPV viral infection that occurs during stress and immune system is unable to fight the virus.\\nWill treat with cryotherapy today to allow immune system to fight off virus. \\nDiscussed with pt if wart blister ups, then can use sterile needle to poke and let fluid drain.\\nDiscussed with pt that they should leave skin attached to help provide a protective barrier while it is healing.\\n\\nDiscussed with pt if it does not disappear, then we my have to treat with Cidofovir anti viral injection at future appointment.\\nAlthough it is not FDA approved, is very effective. \\nDiscussed anticipated side effects of Cidofovir treatment that may include discomfort, burning, swelling, redness, and blistering.\\nF/u in 4 weeks

## 2021-03-17 NOTE — PROCEDURE: LIQUID NITROGEN
Medical Necessity Information: It is in your best interest to select a reason for this procedure from the list below. All of these items fulfill various CMS LCD requirements except the new and changing color options.
Render Post-Care Instructions In Note?: no
Post-Care Instructions: I reviewed with the patient in detail post-care instructions. Patient is to wear sunprotection, and avoid picking at any of the treated lesions. Pt may apply Vaseline to crusted or scabbing areas.
Number Of Freeze-Thaw Cycles: 3 freeze-thaw cycles
Medical Necessity Clause: This procedure was medically necessary because the lesions that were treated were:
Consent: The patient's consent was obtained including but not limited to risks of crusting, scabbing, blistering, scarring, darker or lighter pigmentary change, recurrence, incomplete removal and infection.
Detail Level: Detailed

## 2021-07-28 ENCOUNTER — RX ONLY (OUTPATIENT)
Age: 31
Setting detail: RX ONLY
End: 2021-07-28

## 2021-07-28 ENCOUNTER — APPOINTMENT (RX ONLY)
Dept: URBAN - METROPOLITAN AREA CLINIC 45 | Facility: CLINIC | Age: 31
Setting detail: DERMATOLOGY
End: 2021-07-28

## 2021-07-28 DIAGNOSIS — D22 MELANOCYTIC NEVI: ICD-10-CM

## 2021-07-28 DIAGNOSIS — T07XXXA ABRASION OR FRICTION BURN OF OTHER, MULTIPLE, AND UNSPECIFIED SITES, WITHOUT MENTION OF INFECTION: ICD-10-CM

## 2021-07-28 DIAGNOSIS — L28.1 PRURIGO NODULARIS: ICD-10-CM

## 2021-07-28 DIAGNOSIS — D18.0 HEMANGIOMA: ICD-10-CM

## 2021-07-28 DIAGNOSIS — L81.0 POSTINFLAMMATORY HYPERPIGMENTATION: ICD-10-CM

## 2021-07-28 DIAGNOSIS — B37.0 CANDIDAL STOMATITIS: ICD-10-CM

## 2021-07-28 PROBLEM — D22.5 MELANOCYTIC NEVI OF TRUNK: Status: ACTIVE | Noted: 2021-07-28

## 2021-07-28 PROBLEM — D18.01 HEMANGIOMA OF SKIN AND SUBCUTANEOUS TISSUE: Status: ACTIVE | Noted: 2021-07-28

## 2021-07-28 PROBLEM — T14.8XXA OTHER INJURY OF UNSPECIFIED BODY REGION, INITIAL ENCOUNTER: Status: ACTIVE | Noted: 2021-07-28

## 2021-07-28 PROBLEM — D22.72 MELANOCYTIC NEVI OF LEFT LOWER LIMB, INCLUDING HIP: Status: ACTIVE | Noted: 2021-07-28

## 2021-07-28 PROCEDURE — 11900 INJECT SKIN LESIONS </W 7: CPT

## 2021-07-28 PROCEDURE — 99213 OFFICE O/P EST LOW 20 MIN: CPT | Mod: 25

## 2021-07-28 PROCEDURE — ? PRESCRIPTION

## 2021-07-28 PROCEDURE — ? INTRALESIONAL KENALOG

## 2021-07-28 PROCEDURE — ? OBSERVATION

## 2021-07-28 PROCEDURE — ? COUNSELING

## 2021-07-28 RX ORDER — MINOCYCLINE HYDROCHLORIDE 135 MG/1
CAPSULE, EXTENDED RELEASE ORAL
Qty: 30 | Refills: 2 | Status: ERX | COMMUNITY
Start: 2021-07-28

## 2021-07-28 RX ORDER — CLINDAMYCIN PHOSPHATE 10 MG/G
AEROSOL, FOAM TOPICAL
Qty: 1 | Refills: 4 | Status: ERX | COMMUNITY
Start: 2021-07-28

## 2021-07-28 RX ORDER — SULFACETAMIDE SODIUM, SULFUR 98; 48 MG/G; MG/G
LIQUID TOPICAL
Qty: 1 | Refills: 4 | Status: ERX | COMMUNITY
Start: 2021-07-28

## 2021-07-28 RX ORDER — FLUCONAZOLE 150 MG/1
TABLET ORAL
Qty: 5 | Refills: 0 | Status: ERX | COMMUNITY
Start: 2021-07-28

## 2021-07-28 RX ORDER — CLINDAMYCIN PHOSPHATE 1 %
KIT TOPICAL
Qty: 1 | Refills: 4 | Status: ERX | COMMUNITY
Start: 2021-07-28

## 2021-07-28 RX ADMIN — FLUCONAZOLE: 150 TABLET ORAL at 00:00

## 2021-07-28 ASSESSMENT — LOCATION ZONE DERM
LOCATION ZONE: TRUNK
LOCATION ZONE: LEG
LOCATION ZONE: LIP
LOCATION ZONE: ARM

## 2021-07-28 ASSESSMENT — LOCATION DETAILED DESCRIPTION DERM
LOCATION DETAILED: LEFT INFERIOR VERMILION LIP
LOCATION DETAILED: LEFT LATERAL SUPERIOR CHEST
LOCATION DETAILED: LEFT DISTAL CALF
LOCATION DETAILED: MIDDLE STERNUM
LOCATION DETAILED: LEFT ORAL COMMISSURE
LOCATION DETAILED: LEFT LATERAL INFERIOR CHEST
LOCATION DETAILED: RIGHT ANTERIOR SHOULDER
LOCATION DETAILED: RIGHT LATERAL INFERIOR CHEST

## 2021-07-28 ASSESSMENT — LOCATION SIMPLE DESCRIPTION DERM
LOCATION SIMPLE: RIGHT SHOULDER
LOCATION SIMPLE: CHEST
LOCATION SIMPLE: LEFT LIP
LOCATION SIMPLE: LEFT CALF

## 2021-07-28 NOTE — PROCEDURE: INTRALESIONAL KENALOG
Kenalog Preparation: Kenalog with 5-fluorouracil
Concentration Of Solution Injected (Mg/Ml): 2.5
Total Volume Injected (Ccs- Only Use Numbers And Decimals): .1
Consent: The risks of atrophy were reviewed with the patient.
X Size Of Lesion In Cm (Optional): 0
Medical Necessity Clause: This procedure was medically necessary because the lesions that were treated were:
Detail Level: Detailed
Include Z78.9 (Other Specified Conditions Influencing Health Status) As An Associated Diagnosis?: No
Validate Note Data When Using Inventory: Yes

## 2021-08-16 ENCOUNTER — RX ONLY (OUTPATIENT)
Age: 31
Setting detail: RX ONLY
End: 2021-08-16

## 2021-08-16 RX ORDER — FLUCONAZOLE 150 MG/1
TABLET ORAL
Qty: 5 | Refills: 0 | Status: ERX

## 2021-11-11 ENCOUNTER — APPOINTMENT (RX ONLY)
Dept: URBAN - METROPOLITAN AREA CLINIC 45 | Facility: CLINIC | Age: 31
Setting detail: DERMATOLOGY
End: 2021-11-11

## 2021-11-11 DIAGNOSIS — L01.01 NON-BULLOUS IMPETIGO: ICD-10-CM

## 2021-11-11 DIAGNOSIS — L28.1 PRURIGO NODULARIS: ICD-10-CM

## 2021-11-11 PROCEDURE — 11900 INJECT SKIN LESIONS </W 7: CPT

## 2021-11-11 PROCEDURE — ? TREATMENT REGIMEN

## 2021-11-11 PROCEDURE — ? INTRALESIONAL KENALOG

## 2021-11-11 PROCEDURE — ? COUNSELING

## 2021-11-11 PROCEDURE — 99213 OFFICE O/P EST LOW 20 MIN: CPT | Mod: 25

## 2021-11-11 PROCEDURE — ? PRESCRIPTION

## 2021-11-11 RX ORDER — DOXYCYCLINE HYCLATE 200 MG/1
TABLET, DELAYED RELEASE ORAL
Qty: 30 | Refills: 2 | Status: ERX | COMMUNITY
Start: 2021-11-11

## 2021-11-11 RX ORDER — SULFAMETHOXAZOLE AND TRIMETHOPRIM 800; 160 MG/1; MG/1
TABLET ORAL
Qty: 20 | Refills: 0 | Status: ERX | COMMUNITY
Start: 2021-11-11

## 2021-11-11 RX ADMIN — SULFAMETHOXAZOLE AND TRIMETHOPRIM: 800; 160 TABLET ORAL at 00:00

## 2021-11-11 RX ADMIN — DOXYCYCLINE HYCLATE: 200 TABLET, DELAYED RELEASE ORAL at 00:00

## 2021-11-11 ASSESSMENT — LOCATION ZONE DERM
LOCATION ZONE: SCALP
LOCATION ZONE: TRUNK

## 2021-11-11 ASSESSMENT — LOCATION DETAILED DESCRIPTION DERM
LOCATION DETAILED: LEFT INFERIOR OCCIPITAL SCALP
LOCATION DETAILED: RIGHT LATERAL INFERIOR CHEST

## 2021-11-11 ASSESSMENT — LOCATION SIMPLE DESCRIPTION DERM
LOCATION SIMPLE: POSTERIOR SCALP
LOCATION SIMPLE: CHEST

## 2021-11-11 NOTE — PROCEDURE: TREATMENT REGIMEN
Plan: Location:right lateral inferior chest \\nPrescribe: Bactrim  mg-160 mg tablet (Take 1 tablet by mouth twice a day for 10 days)\\nDoryx 200 mg tablet,delayed release \\n\\n\\nPt has irritation around right chest today.\\nPt discussed that it is very itchy and inflamed today.\\nToday there is honey yellow crust developing around the inflammation.\\nDiscussed with pt that this is most likely impetigo.\\nDiscussed with pt that this is a bacterial skin infection that often comes on irritated skin.\\nWill culture today, which will allows us to start him on the best possible antibiotic.\\nDiscussed being gentle with the skin---no peroxide or etoh, no neosporin.\\n\\nWill also prescribe pt Bactrim  mg-160 mg tablet (Take 1 tablet by mouth twice a day for 10days) and Doryx 200 mg tablet,delayed release to help fight infection.\\n\\nF/u 3months
Detail Level: Zone
Plan: Location: right lateral inferior chest and scalp\\nTreatment: 5FU/K40\\nPrescribe: Bactrim  mg-160 mg tablet (Take 1 tablet by mouth twice a day for 10 days)\\nDoryx 200 mg tablet,delayed release \\n\\nPatient is here following up \\nPatient states the last visit his pickers nodules have improved \\nPatient states the would like an injection again at today’s visit \\nPatient is here for further evaluation and treatment \\n\\nDiscussed with patient his pickers nodule looks way better \\nInformed the patient there is staph infection on his pickers nodule \\nDiscussed with patient I will inject it with 5FU/K40 to help alleviate inflammation\\nDiscussed with patient I will prescribe Bactrim  mg-160 mg tablet (Take 1 tablet by mouth twice a day for 10 days) and Doryx 200 mg tablet,delayed release

## 2022-02-09 ENCOUNTER — RX ONLY (OUTPATIENT)
Age: 32
Setting detail: RX ONLY
End: 2022-02-09

## 2022-02-09 ENCOUNTER — APPOINTMENT (RX ONLY)
Dept: URBAN - METROPOLITAN AREA CLINIC 45 | Facility: CLINIC | Age: 32
Setting detail: DERMATOLOGY
End: 2022-02-09

## 2022-02-09 DIAGNOSIS — L21.8 OTHER SEBORRHEIC DERMATITIS: ICD-10-CM

## 2022-02-09 DIAGNOSIS — B07.8 OTHER VIRAL WARTS: ICD-10-CM

## 2022-02-09 DIAGNOSIS — L01.01 NON-BULLOUS IMPETIGO: ICD-10-CM

## 2022-02-09 DIAGNOSIS — L28.1 PRURIGO NODULARIS: ICD-10-CM

## 2022-02-09 PROCEDURE — ? PRESCRIPTION

## 2022-02-09 PROCEDURE — 99213 OFFICE O/P EST LOW 20 MIN: CPT | Mod: 25

## 2022-02-09 PROCEDURE — ? LIQUID NITROGEN

## 2022-02-09 PROCEDURE — 17110 DESTRUCTION B9 LES UP TO 14: CPT

## 2022-02-09 PROCEDURE — ? TREATMENT REGIMEN

## 2022-02-09 PROCEDURE — ? COUNSELING

## 2022-02-09 RX ORDER — HYDROCORTISONE ACETATE, IODOQUINOL 19; 10 MG/G; MG/G
CREAM TOPICAL
Qty: 30 | Refills: 4 | Status: ERX | COMMUNITY
Start: 2022-02-09

## 2022-02-09 RX ORDER — CLINDAMYCIN PHOSPHATE 1 %
KIT TOPICAL
Qty: 1 | Refills: 5 | Status: ERX

## 2022-02-09 RX ADMIN — HYDROCORTISONE ACETATE, IODOQUINOL: 19; 10 CREAM TOPICAL at 00:00

## 2022-02-09 ASSESSMENT — LOCATION SIMPLE DESCRIPTION DERM
LOCATION SIMPLE: PENIS
LOCATION SIMPLE: LEFT LIP

## 2022-02-09 ASSESSMENT — LOCATION DETAILED DESCRIPTION DERM
LOCATION DETAILED: RIGHT DORSAL SHAFT OF PENIS
LOCATION DETAILED: LEFT INFERIOR VERMILION LIP

## 2022-02-09 ASSESSMENT — LOCATION ZONE DERM
LOCATION ZONE: LIP
LOCATION ZONE: PENIS

## 2022-02-09 NOTE — PROCEDURE: TREATMENT REGIMEN
Plan: Location: right lateral inferior chest and scalp\\nPrevious Treatment: 5FU/K40\\nPrevious Prescribe: Bactrim  mg-160 mg tablet (Take 1 tablet by mouth twice a day for 10 days)\\n                                  Doryx 200 mg tablet,delayed release \\n\\n\\n2/9/2022\\n\\nPatient is here following up \\nPatient states that his pickers nodules is all improved and is all clear \\nHe is very satisfied and is here for further evaluation and treatment \\n\\n\\nDiscussed with patient his pickers nodule looks way better \\nDiscussed with patient I want him to finish out the medication \\nPLAN\\n1)Informed the patient I want him to start back taking Doryx 200 mg tablet,delayed release\\n2)Instructed the patient to take five days worth of the Bactrim  mg-160 mg tablet\\n3)Then I want the patient to finish out the Doryx 200 mg tablet,delayed release\\n\\nFollow up PRN
Detail Level: Zone
Plan: Location:Lip\\nPrescribe: Bactrim  mg-160 mg tablet (Take 1 tablet by mouth twice a day for 10 days)\\n                  Doryx 200 mg tablet,delayed release \\n\\n\\nPt is here following up \\nPatient states two weeks ago his lip was infected and at todays visit it is clearing up \\nPatient is here for further evaluation and treatment \\n\\nToday informed the patient to finish off all the medication he has, since he never took them \\nDiscussed with pt that this is most likely impetigo remaining \\nDiscussed with pt that this is a bacterial skin infection that often comes on irritated skin.\\nDiscussed being gentle with the skin---no peroxide or etoh, no neosporin.\\n\\nWill also prescribe pt Bactrim  mg-160 mg tablet (Take 1 tablet by mouth twice a day for 10days) and Doryx 200 mg tablet,delayed release to help fight infection.\\n\\nF/u 3months
Plan: Location: groin,Lip\\nTreatment: LN2\\n\\n\\nDiscussed with patient that they are a common HPV viral infection that occurs during stress and immune system is unable to fight the virus.\\nWill treat with cryotherapy today to allow immune system to fight off virus. \\nDiscussed with pt if wart blister ups, then can use sterile needle to poke and let fluid drain.\\nDiscussed with pt that they should leave skin attached to help provide a protective barrier while it is healing.\\n\\nDiscussed with pt if it does not disappear, then we my have to treat with Cidofovir anti viral injection at future appointment.\\nAlthough it is not FDA approved, is very effective. \\nDiscussed anticipated side effects of Cidofovir treatment that may include discomfort, burning, swelling, redness, and blistering.\\nF/u in 4 weeks
Plan: Location:glabella \\nPrescribe: Vytone 1.9 %-1 % topical cream packet \\n\\nDiscussed with patient that this irritation, dry scaly rash is an immune mediated condition that can be triggered or exacerbated by several factors such as lack of sleep, stress, allergies, or illness. \\nThere is also a genetic component which we discussed can be helped by taking good care of the skin with a barrier cream and avoiding harsh products.\\n\\nWill prescribe Vytone 1.9 %-1 % topical cream packet to help relieve inflammation.\\nRecommend taking Allegra 180mg daily to decrease histamine level and eliminate this trigger.\\nF/u as needed

## 2022-04-28 ENCOUNTER — APPOINTMENT (RX ONLY)
Dept: URBAN - METROPOLITAN AREA CLINIC 45 | Facility: CLINIC | Age: 32
Setting detail: DERMATOLOGY
End: 2022-04-28

## 2022-04-28 DIAGNOSIS — L01.01 NON-BULLOUS IMPETIGO: ICD-10-CM

## 2022-04-28 DIAGNOSIS — L28.1 PRURIGO NODULARIS: ICD-10-CM

## 2022-04-28 PROCEDURE — ? TREATMENT REGIMEN

## 2022-04-28 PROCEDURE — ? INTRALESIONAL KENALOG

## 2022-04-28 PROCEDURE — 11900 INJECT SKIN LESIONS </W 7: CPT

## 2022-04-28 PROCEDURE — ? PRESCRIPTION

## 2022-04-28 PROCEDURE — ? COUNSELING

## 2022-04-28 PROCEDURE — 99213 OFFICE O/P EST LOW 20 MIN: CPT | Mod: 25

## 2022-04-28 RX ORDER — DOXYCYCLINE HYCLATE 100 MG/1
TABLET, DELAYED RELEASE ORAL
Qty: 30 | Refills: 3 | Status: ERX | COMMUNITY
Start: 2022-04-28

## 2022-04-28 RX ADMIN — DOXYCYCLINE HYCLATE: 100 TABLET, DELAYED RELEASE ORAL at 00:00

## 2022-04-28 ASSESSMENT — LOCATION DETAILED DESCRIPTION DERM
LOCATION DETAILED: RIGHT SUPERIOR LATERAL NECK
LOCATION DETAILED: RIGHT LATERAL SUPERIOR CHEST
LOCATION DETAILED: LEFT CLAVICULAR NECK

## 2022-04-28 ASSESSMENT — LOCATION SIMPLE DESCRIPTION DERM
LOCATION SIMPLE: RIGHT ANTERIOR NECK
LOCATION SIMPLE: CHEST
LOCATION SIMPLE: LEFT ANTERIOR NECK

## 2022-04-28 ASSESSMENT — LOCATION ZONE DERM
LOCATION ZONE: TRUNK
LOCATION ZONE: NECK

## 2022-04-28 NOTE — PROCEDURE: INTRALESIONAL KENALOG
Consent: The risks of atrophy were reviewed with the patient.
Concentration Of Solution Injected (Mg/Ml): 2.5
Total Volume Injected (Ccs- Only Use Numbers And Decimals): .1
X Size Of Lesion In Cm (Optional): 0
Validate Note Data When Using Inventory: Yes
Detail Level: Detailed
Medical Necessity Clause: This procedure was medically necessary because the lesions that were treated were:
Include Z78.9 (Other Specified Conditions Influencing Health Status) As An Associated Diagnosis?: No
Kenalog Preparation: Kenalog

## 2022-04-28 NOTE — PROCEDURE: TREATMENT REGIMEN
Plan: Location:Lip\\nPrescribe: Bactrim  mg-160 mg tablet (Take 1 tablet by mouth twice a day for 10 days)\\n                  Doryx 200 mg tablet,delayed release  ( as needed for flare ups)\\nNew Rx: Doxycycline 100mg extended release ( to take daily for the month of April, May and June) In July he will stop and will follow up at the end of July\\n\\n\\nPt is here following up \\nPatient states that he has been on the following regimen:\\nDoryx 200mg tablet for three days\\nBactrim -160mg tablet for five days\\nDoryx 200mg tablet for 27 days and states all has improved\\nHe continues using CLN Body wash three times a week and at times Plexion Cleanser\\nHe daily uses Clindacin ETZ Wipes especially after working out.\\nPatient is here for further evaluation and treatment \\n\\nDiscussed with pt:\\nEvaluating him today and comparing photos his bottom lip has improved, but there is still room for improvement\\nDiscussed with pt that this is most likely impetigo remaining \\nReiterated to pt that this is a maintenance and will continue on the following regimen:\\n1. Start Doxycycline 100mg tablet extended release; she is to take daily for April, May and June\\n****he will take the Doryx 200mg for major flare ups****\\n2. Continue Clindacin ETZ Wipes daily\\n3. Bactrim -160 mg tablet only if there is any yellow crust; pt has about five days left at home\\nDiscussed with pt that staph is a bacterial skin infection that often comes on irritated skin.\\nPt is to follow this regimen closely to help break the staph cycles\\nDiscussed being gentle with the skin---no peroxide or etoh, no neosporin.\\nInstructions were written for pt and pt voiced understanding.\\n\\nF/u 6 months
Detail Level: Zone
Plan: Location: right lateral inferior chest and scalp\\nPrevious Treatment: 5FU/K40\\nPrevious Prescribe: Bactrim  mg-160 mg tablet (Take 1 tablet by mouth twice a day for 10 days)\\n                                  Doryx 200 mg tablet,delayed release \\n\\n\\n2/9/2022\\n\\nPatient is here following up \\nPatient states that his pickers nodules is all improved and is all clear \\nHe is very satisfied and is here for further evaluation and treatment \\n\\n\\nDiscussed with patient his pickers nodule looks way better \\nDiscussed with patient I want him to finish out the medication \\nPLAN\\n1)Informed the patient I want him to start back taking Doryx 200 mg tablet,delayed release\\n2)Instructed the patient to take five days worth of the Bactrim  mg-160 mg tablet\\n3)Then I want the patient to finish out the Doryx 200 mg tablet,delayed release\\n\\nFollow up PRN

## 2022-06-01 ENCOUNTER — RX ONLY (OUTPATIENT)
Age: 32
Setting detail: RX ONLY
End: 2022-06-01

## 2022-06-01 RX ORDER — SULFAMETHOXAZOLE AND TRIMETHOPRIM 800; 160 MG/1; MG/1
TABLET ORAL
Qty: 20 | Refills: 0 | Status: ERX

## 2022-11-16 ENCOUNTER — APPOINTMENT (RX ONLY)
Dept: URBAN - METROPOLITAN AREA CLINIC 45 | Facility: CLINIC | Age: 32
Setting detail: DERMATOLOGY
End: 2022-11-16

## 2022-11-16 DIAGNOSIS — A63.0 ANOGENITAL (VENEREAL) WARTS: ICD-10-CM

## 2022-11-16 DIAGNOSIS — L85.3 XEROSIS CUTIS: ICD-10-CM

## 2022-11-16 DIAGNOSIS — L81.4 OTHER MELANIN HYPERPIGMENTATION: ICD-10-CM

## 2022-11-16 DIAGNOSIS — L28.1 PRURIGO NODULARIS: ICD-10-CM

## 2022-11-16 PROCEDURE — 99213 OFFICE O/P EST LOW 20 MIN: CPT | Mod: 25

## 2022-11-16 PROCEDURE — ? TREATMENT REGIMEN

## 2022-11-16 PROCEDURE — ? COUNSELING

## 2022-11-16 PROCEDURE — ? INTRALESIONAL KENALOG

## 2022-11-16 PROCEDURE — ? PRESCRIPTION

## 2022-11-16 PROCEDURE — 54056 CRYOSURGERY PENIS LESION(S): CPT

## 2022-11-16 PROCEDURE — ? LIQUID NITROGEN GENITALS MULTI

## 2022-11-16 RX ORDER — CLINDAMYCIN PHOSPHATE 10 MG/G
GEL TOPICAL
Qty: 60 | Refills: 10 | Status: ERX | COMMUNITY
Start: 2022-11-16

## 2022-11-16 RX ORDER — CLINDAMYCIN PHOSPHATE 1 %
KIT TOPICAL
Qty: 30 | Refills: 10 | Status: ERX

## 2022-11-16 RX ORDER — DOXYCYCLINE HYCLATE 200 MG/1
TABLET, DELAYED RELEASE ORAL
Qty: 30 | Refills: 10 | Status: ERX

## 2022-11-16 RX ADMIN — CLINDAMYCIN PHOSPHATE: 10 GEL TOPICAL at 00:00

## 2022-11-16 ASSESSMENT — LOCATION SIMPLE DESCRIPTION DERM
LOCATION SIMPLE: RIGHT FOREHEAD
LOCATION SIMPLE: SUBMENTAL CHIN
LOCATION SIMPLE: RIGHT ELBOW
LOCATION SIMPLE: POSTERIOR NECK
LOCATION SIMPLE: LEFT ELBOW
LOCATION SIMPLE: PENIS

## 2022-11-16 ASSESSMENT — LOCATION DETAILED DESCRIPTION DERM
LOCATION DETAILED: SUBMENTAL CHIN
LOCATION DETAILED: RIGHT ELBOW
LOCATION DETAILED: LEFT DORSAL SHAFT OF PENIS
LOCATION DETAILED: LEFT ELBOW
LOCATION DETAILED: RIGHT INFERIOR LATERAL FOREHEAD
LOCATION DETAILED: LEFT MEDIAL TRAPEZIAL NECK

## 2022-11-16 ASSESSMENT — LOCATION ZONE DERM
LOCATION ZONE: PENIS
LOCATION ZONE: NECK
LOCATION ZONE: ARM
LOCATION ZONE: FACE

## 2022-11-16 NOTE — PROCEDURE: TREATMENT REGIMEN
Plan: Location: submental chin and right temple\\nPrevious Treatment: 5FU/K40\\nPrevious Prescribe: Bactrim  mg-160 mg tablet (Take 1 tablet by mouth twice a day for 10 days)\\n                                  Doryx 200 mg tablet,delayed release \\nTodays treatment:5FU/K10\\nPrescribe: Doryx 200 mg tablet,\\nclindamycin 1 % topical gel \\nClindacin ETZ 1 % topical kit(Apply to the affected areas once daily when needed)\\n\\n\\n11/16/2022\\n\\nPatient is here following up \\nPatient states that his pickers nodules is all improved and is all clear other than 2new lesions \\nPatient mentions he would like the injection to help with the inflammation \\nPt mentions he would like a refill on the Doryx 200mg and Clindacin ETZ 1 % topical kit\\nHe is very satisfied and is here for further evaluation and treatment \\n\\n\\nDiscussed with patient his pickers nodule looks way better \\nToday I will administer 5FU/K10 into 2pickers nodule \\nDiscussed with patient I will refill his medications \\nPLAN\\n1)Informed the patient I want him to start back taking Doryx 200 mg tablet,delayed release\\n2) will refill Clindacin ETZ 1 % topical kit(Apply to the affected areas once daily when needed)\\n3) I will refill clindamycin 1 % topical gel \\n\\nFollow up PRN
Detail Level: Zone
Plan: Location:genital region \\nTreatment: LN2 \\n\\nPt presents with with genital warts \\nDiscussed with patient that they are a common HPV viral infection that occurs during stress and immune system is unable to fight the virus.\\nWill treat with cryotherapy today to allow immune system to fight off virus. \\nDiscussed with pt if wart blister ups, then can use sterile needle to poke and let fluid drain.\\nDiscussed with pt that they should leave skin attached to help provide a protective barrier while it is healing.\\n\\nDiscussed with pt if it does not disappear, then we my have to treat with Cidofovir anti viral injection at future appointment.\\nAlthough it is not FDA approved, is very effective. \\nDiscussed anticipated side effects of Cidofovir treatment that may include discomfort, burning, swelling, redness, and blistering.\\nF/u in 6-8 weeks

## 2022-11-16 NOTE — PROCEDURE: LIQUID NITROGEN GENITALS MULTI
Total Number Of Lesions Treated: 5
Render Post-Care Instructions In Note?: no
Post-Care Instructions: I reviewed with the patient in detail post-care instructions. Patient is to wear sunprotection, and avoid picking at any of the treated lesions. Pt may apply Vaseline to crusted or scabbing areas.
Detail Level: Zone
Consent: The patient's consent was obtained including but not limited to risks of crusting, scabbing, blistering, scarring, darker or lighter pigmentary change, recurrence, incomplete removal and infection.

## 2023-03-13 ENCOUNTER — APPOINTMENT (RX ONLY)
Dept: URBAN - METROPOLITAN AREA CLINIC 45 | Facility: CLINIC | Age: 33
Setting detail: DERMATOLOGY
End: 2023-03-13

## 2023-03-13 DIAGNOSIS — A63.0 ANOGENITAL (VENEREAL) WARTS: ICD-10-CM

## 2023-03-13 DIAGNOSIS — L28.1 PRURIGO NODULARIS: ICD-10-CM

## 2023-03-13 DIAGNOSIS — L85.3 XEROSIS CUTIS: ICD-10-CM

## 2023-03-13 PROCEDURE — ? LIQUID NITROGEN GENITALS MULTI

## 2023-03-13 PROCEDURE — 99213 OFFICE O/P EST LOW 20 MIN: CPT | Mod: 25

## 2023-03-13 PROCEDURE — 54056 CRYOSURGERY PENIS LESION(S): CPT

## 2023-03-13 PROCEDURE — ? TREATMENT REGIMEN

## 2023-03-13 PROCEDURE — ? COUNSELING

## 2023-03-13 PROCEDURE — ? INTRALESIONAL KENALOG

## 2023-03-13 ASSESSMENT — LOCATION ZONE DERM
LOCATION ZONE: TRUNK
LOCATION ZONE: FACE
LOCATION ZONE: NECK
LOCATION ZONE: PENIS
LOCATION ZONE: ARM

## 2023-03-13 ASSESSMENT — LOCATION DETAILED DESCRIPTION DERM
LOCATION DETAILED: LEFT SUPERIOR UPPER BACK
LOCATION DETAILED: SUBMENTAL CHIN
LOCATION DETAILED: LEFT DORSAL SHAFT OF PENIS
LOCATION DETAILED: RIGHT INFERIOR LATERAL FOREHEAD
LOCATION DETAILED: LEFT INFERIOR POSTERIOR NECK
LOCATION DETAILED: RIGHT MEDIAL UPPER BACK
LOCATION DETAILED: RIGHT POSTERIOR NECK
LOCATION DETAILED: RIGHT ELBOW
LOCATION DETAILED: RIGHT SUPERIOR UPPER BACK
LOCATION DETAILED: LEFT ELBOW
LOCATION DETAILED: LEFT POSTERIOR SHOULDER

## 2023-03-13 ASSESSMENT — LOCATION SIMPLE DESCRIPTION DERM
LOCATION SIMPLE: POSTERIOR NECK
LOCATION SIMPLE: RIGHT ELBOW
LOCATION SIMPLE: SUBMENTAL CHIN
LOCATION SIMPLE: LEFT UPPER BACK
LOCATION SIMPLE: LEFT ELBOW
LOCATION SIMPLE: PENIS
LOCATION SIMPLE: RIGHT UPPER BACK
LOCATION SIMPLE: LEFT SHOULDER
LOCATION SIMPLE: RIGHT FOREHEAD

## 2023-03-13 NOTE — PROCEDURE: TREATMENT REGIMEN
Plan: Location: submental chin and right temple\\nPrevious Treatment: 5FU/K40\\nPrevious Prescribe: Bactrim  mg-160 mg tablet (Take 1 tablet by mouth twice a day for 10 days)\\n                                  Doryx 200 mg tablet,delayed release \\nTodays treatment:5FU/K10\\nPrescribe: Doryx 200 mg tablet,\\nclindamycin 1 % topical gel \\nClindacin ETZ 1 % topical kit(Apply to the affected areas once daily when needed)\\n\\n\\n3/13/2023\\n\\nPt is here following up on his pickers nodules.\\nHe states that lesions have improved but has some other areas he would like treated.\\nPt states that he has noticed while he’s on doxycycline his lips will start to get chapped and the skin will be raw and sensitive.\\nHe states that he discontinued the oral medication and his lips have started to heal.\\nOverall he’s here further evaluation and treatment.\\n\\n\\nDiscussed with pt that upon further examination he does present with some lesions.\\nToday I will administer 5FU/K40 into 2pickers nodule \\nDiscussed with patient I will refill his medications \\nPLAN\\n1)Informed the patient I want him to start back taking Doryx 200 mg tablet,delayed release\\n2) will refill Clindacin ETZ 1 % topical kit(Apply to the affected areas once daily when needed)\\n3) I will refill clindamycin 1 % topical gel \\n\\nFollow up PRN
Detail Level: Zone

## 2023-06-01 ENCOUNTER — APPOINTMENT (RX ONLY)
Dept: URBAN - METROPOLITAN AREA CLINIC 45 | Facility: CLINIC | Age: 33
Setting detail: DERMATOLOGY
End: 2023-06-01

## 2023-06-01 DIAGNOSIS — L85.3 XEROSIS CUTIS: ICD-10-CM

## 2023-06-01 DIAGNOSIS — L28.1 PRURIGO NODULARIS: ICD-10-CM

## 2023-06-01 DIAGNOSIS — L81.4 OTHER MELANIN HYPERPIGMENTATION: ICD-10-CM

## 2023-06-01 PROCEDURE — 99213 OFFICE O/P EST LOW 20 MIN: CPT | Mod: 25

## 2023-06-01 PROCEDURE — ? PRESCRIPTION

## 2023-06-01 PROCEDURE — 11900 INJECT SKIN LESIONS </W 7: CPT

## 2023-06-01 PROCEDURE — ? COUNSELING

## 2023-06-01 PROCEDURE — ? INTRALESIONAL KENALOG

## 2023-06-01 PROCEDURE — ? TREATMENT REGIMEN

## 2023-06-01 RX ORDER — PHARMACY COMPOUNDING ACCESSORY
EACH MISCELLANEOUS
Qty: 50 | Refills: 6 | Status: ERX | COMMUNITY
Start: 2023-06-01

## 2023-06-01 RX ORDER — DOXYCYCLINE HYCLATE 100 MG/1
CAPSULE, GELATIN COATED ORAL
Qty: 60 | Refills: 6 | Status: ERX | COMMUNITY
Start: 2023-06-01

## 2023-06-01 RX ORDER — SULFACETAMIDE SODIUM, SULFUR 98; 48 MG/G; MG/G
LIQUID TOPICAL
Qty: 285 | Refills: 6 | Status: ERX

## 2023-06-01 RX ORDER — CLINDAMYCIN PHOSPHATE 1 %
KIT TOPICAL
Qty: 30 | Refills: 7 | Status: ERX

## 2023-06-01 RX ADMIN — DOXYCYCLINE HYCLATE: 100 CAPSULE, GELATIN COATED ORAL at 00:00

## 2023-06-01 RX ADMIN — Medication: at 00:00

## 2023-06-01 ASSESSMENT — LOCATION ZONE DERM
LOCATION ZONE: NECK
LOCATION ZONE: TRUNK
LOCATION ZONE: ARM
LOCATION ZONE: FACE

## 2023-06-01 ASSESSMENT — LOCATION DETAILED DESCRIPTION DERM
LOCATION DETAILED: LEFT INFERIOR POSTERIOR NECK
LOCATION DETAILED: LEFT ELBOW
LOCATION DETAILED: LEFT SUPERIOR UPPER BACK
LOCATION DETAILED: RIGHT MEDIAL UPPER BACK
LOCATION DETAILED: SUBMENTAL CHIN
LOCATION DETAILED: RIGHT ELBOW
LOCATION DETAILED: RIGHT SUPERIOR UPPER BACK
LOCATION DETAILED: RIGHT POSTERIOR NECK
LOCATION DETAILED: RIGHT INFERIOR LATERAL FOREHEAD

## 2023-06-01 ASSESSMENT — LOCATION SIMPLE DESCRIPTION DERM
LOCATION SIMPLE: LEFT ELBOW
LOCATION SIMPLE: RIGHT UPPER BACK
LOCATION SIMPLE: LEFT UPPER BACK
LOCATION SIMPLE: RIGHT FOREHEAD
LOCATION SIMPLE: RIGHT ELBOW
LOCATION SIMPLE: POSTERIOR NECK
LOCATION SIMPLE: SUBMENTAL CHIN

## 2023-06-01 NOTE — PROCEDURE: TREATMENT REGIMEN
Plan: Location: submental chin and right temple\\nPrevious Prescribe: Bactrim  mg-160 mg tablet (Take 1 tablet by mouth twice a day for 10 days)\\nDoryx 200 mg tablet,delayed release \\nclindamycin 1 % topical gel \\nClindacin ETZ 1 % topical kit(Apply to the affected areas once daily when needed)\\nRefilling the following: CLINDACIN ETZ WIPES, DOXYCYCLINE HYCLATE 100MG BID, PLEXION TOPICAL CLEANSER \\n\\n06/01/2023\\n\\nPatient is here for a follow up\\nPsaji was previously seen on 03/13/2023, at this visit his medication was refilled and multiple different areas were injected with 5FU/K40 to help calm down irritation.\\nPsaji comes into the office today with concerns that Doxycycline might be the cause of blister like irritation on his lip.\\nHe mentioned every time he takes oral antibiotic he tends to get a flare up right after if taken for more than 3 days, discussed with the patient if after this flares continue to persist he is to take oral Ximino 135mg. \\nDiscussed with the patient this works just as good as the Doxycycline as they are both anti inflammatories.\\Ibis addition I have instructed him to continue current treatment regimen and follow up as instructed. \\n\\nPLAN:\\n\\n1. Patient is to continue taking oral Doxycycline no more than 3 days, if further treatment is required he was then instructed to take oral Ximino \\n2. Continue cleansing face with Plexion topical cleanser daily\\n3. Continue using ETZ WIPES as needed or throughout the day\\n4. Patient was instructed to apply a pea sized amount of the compounding medication nightly to the T zone to help with preventing acne flares.\\n\\nShe is to continue regimen and follow up in 6 months
Detail Level: Zone
Plan: Location: face\\nPrescribed: HQ 4% Tretinoin 0.05% compound cream QHS\\n\\nPt has hyperpigmentation on face today.\\nDiscussed with pt that this pigmentation is due to sun damage to the skin, advised patient to use SPF daily.\\nDiscussed with pt that we will start patient on HQ 4% Tretinoin 0.05% to use nightly.\\nDiscussed with pt to apply a pea size amount to face, pushing into pores. \\nDiscussed with pt to avoid areas around eyes, nasal crease, and around the lips since skin is thin and may get irritated easily.\\n\\nDiscussed with pt that it will come in a box that says keep refrigerated.\\nDiscussed with pt that they do not need to keep it refrigerated, but have to keep it in a dark place since sunlight may oxidize it.\\nDiscussed with pt that if skin becomes irritated while using cream, then pt can change frequency to every other night, or every other other night, etc.\\nDiscussed with pt that results are not immediate and may take up to a month to notice change.\\nAdvised pt to apply a moisturizing cream such as Cerave afterwards to help reestablish a healthy skin barrier.\\nF/u as needed.

## 2023-11-30 ENCOUNTER — APPOINTMENT (RX ONLY)
Dept: URBAN - METROPOLITAN AREA CLINIC 45 | Facility: CLINIC | Age: 33
Setting detail: DERMATOLOGY
End: 2023-11-30

## 2023-11-30 DIAGNOSIS — L81.4 OTHER MELANIN HYPERPIGMENTATION: ICD-10-CM

## 2023-11-30 DIAGNOSIS — B35.1 TINEA UNGUIUM: ICD-10-CM

## 2023-11-30 DIAGNOSIS — L28.1 PRURIGO NODULARIS: ICD-10-CM

## 2023-11-30 DIAGNOSIS — L85.3 XEROSIS CUTIS: ICD-10-CM

## 2023-11-30 PROCEDURE — ? PRESCRIPTION

## 2023-11-30 PROCEDURE — ? INTRALESIONAL KENALOG

## 2023-11-30 PROCEDURE — 99213 OFFICE O/P EST LOW 20 MIN: CPT | Mod: 25

## 2023-11-30 PROCEDURE — 11900 INJECT SKIN LESIONS </W 7: CPT

## 2023-11-30 PROCEDURE — ? TREATMENT REGIMEN

## 2023-11-30 PROCEDURE — ? COUNSELING

## 2023-11-30 RX ORDER — FLURANDRENOLIDE 4 UG/CM2
TAPE TOPICAL
Qty: 1 | Refills: 3 | Status: ERX | COMMUNITY
Start: 2023-11-30

## 2023-11-30 RX ORDER — DOXYCYCLINE HYCLATE 100 MG/1
CAPSULE, GELATIN COATED ORAL
Qty: 60 | Refills: 6 | Status: ERX

## 2023-11-30 RX ORDER — CLINDAMYCIN PHOSPHATE 10 MG/G
AEROSOL, FOAM TOPICAL
Qty: 100 | Refills: 6 | Status: ERX

## 2023-11-30 RX ORDER — MINOCYCLINE HYDROCHLORIDE 115 MG/1
TABLET, FILM COATED, EXTENDED RELEASE ORAL
Qty: 30 | Refills: 7 | Status: ERX | COMMUNITY
Start: 2023-11-30

## 2023-11-30 RX ORDER — FLUCONAZOLE 100 MG/1
TABLET ORAL
Qty: 5 | Refills: 0 | Status: ERX | COMMUNITY
Start: 2023-11-30

## 2023-11-30 RX ORDER — UREA 40 %
CREAM (GRAM) TOPICAL
Qty: 28 | Refills: 7 | Status: ERX | COMMUNITY
Start: 2023-11-30

## 2023-11-30 RX ORDER — CLINDAMYCIN PHOSPHATE 1 %
KIT TOPICAL
Qty: 30 | Refills: 7 | Status: ERX

## 2023-11-30 RX ORDER — SULFACETAMIDE SODIUM, SULFUR 98; 48 MG/G; MG/G
LIQUID TOPICAL
Qty: 285 | Refills: 6 | Status: ERX

## 2023-11-30 RX ORDER — PHARMACY COMPOUNDING ACCESSORY
EACH MISCELLANEOUS
Qty: 50 | Refills: 6 | Status: ERX

## 2023-11-30 RX ADMIN — MINOCYCLINE HYDROCHLORIDE: 115 TABLET, FILM COATED, EXTENDED RELEASE ORAL at 00:00

## 2023-11-30 RX ADMIN — FLURANDRENOLIDE: 4 TAPE TOPICAL at 00:00

## 2023-11-30 RX ADMIN — Medication: at 00:00

## 2023-11-30 RX ADMIN — FLUCONAZOLE: 100 TABLET ORAL at 00:00

## 2023-11-30 ASSESSMENT — LOCATION DETAILED DESCRIPTION DERM
LOCATION DETAILED: LEFT SUPERIOR UPPER BACK
LOCATION DETAILED: RIGHT ELBOW
LOCATION DETAILED: LEFT ELBOW
LOCATION DETAILED: RIGHT POSTERIOR NECK
LOCATION DETAILED: RIGHT MEDIAL UPPER BACK

## 2023-11-30 ASSESSMENT — LOCATION SIMPLE DESCRIPTION DERM
LOCATION SIMPLE: RIGHT UPPER BACK
LOCATION SIMPLE: LEFT UPPER BACK
LOCATION SIMPLE: POSTERIOR NECK
LOCATION SIMPLE: LEFT ELBOW
LOCATION SIMPLE: RIGHT ELBOW

## 2023-11-30 ASSESSMENT — LOCATION ZONE DERM
LOCATION ZONE: TRUNK
LOCATION ZONE: NECK
LOCATION ZONE: ARM

## 2023-11-30 NOTE — PROCEDURE: TREATMENT REGIMEN
Plan: Location: submental chin and right temple\\nPrescribe:\\nClindacin ETZ 1 % topical kit(Apply to the affected areas once daily when needed)\\nRefilling the following: CLINDACIN ETZ WIPES, DOXYCYCLINE HYCLATE 100MG BID, PLEXION TOPICAL CLEANSE, evoclin, Solodyn \\n\\n11/30/23\\n\\nPatient is here for a follow up\\nPt states he has been sticking to current regimen and it is working for him\\nToday pt would lie, refills on all medication\\nHe would also like to add Solodyn to his regimen as well as evoclin foam\\nToday we will inject  5FU/K40 to help calm down irritation.\\nWe will also refill all medication\\n\\n\\nPLAN:\\n\\n1. Patient is to continue taking oral Doxycycline, if further treatment is required he was then instructed to take oral Solodyn \\n2. Continue cleansing face with Plexion topical cleanser daily\\n3. Continue using ETZ WIPES as needed or throughout the day\\n4. Patient was instructed to apply a pea sized amount of the compounding medication nightly to the T zone to help with preventing acne flares.\\n\\nShe is to continue regimen and follow up in 6 months
Detail Level: Zone
Plan: Location: face\\nPrescribed: HQ 4% Tretinoin 0.05% compound cream QHS\\n\\nPt has hyperpigmentation on face today.\\nDiscussed with pt that this pigmentation is due to sun damage to the skin, advised patient to use SPF daily.\\nDiscussed with pt that we will start patient on HQ 4% Tretinoin 0.05% to use nightly.\\nDiscussed with pt to apply a pea size amount to face, pushing into pores. \\nDiscussed with pt to avoid areas around eyes, nasal crease, and around the lips since skin is thin and may get irritated easily.\\n\\nDiscussed with pt that it will come in a box that says keep refrigerated.\\nDiscussed with pt that they do not need to keep it refrigerated, but have to keep it in a dark place since sunlight may oxidize it.\\nDiscussed with pt that if skin becomes irritated while using cream, then pt can change frequency to every other night, or every other other night, etc.\\nDiscussed with pt that results are not immediate and may take up to a month to notice change.\\nAdvised pt to apply a moisturizing cream such as Cerave afterwards to help reestablish a healthy skin barrier.\\nF/u as needed.
Plan: Location: Feet and hands\\nPrescribe: Fluconazole 100mg x 5 days, urea 40 % topical cream\\n\\nDiscussed with patient that he has severe fungal infection.\\nEducated patient that fungal infection can grow after having trauma, humid environment, or when theres an opening in the nail bed\\n\\nDiscussed with pt that we will prescribe Fluconazole 100mg to help fight fungal infection.\\nDiscussed with patient that nail bed typically takes 9-12 months to completely grow out fungal infection.\\nOnce patient completes oral treatment, we will start patient on topical treatment such as Jublia or Kerydin. \\n\\nF/u in 3 months.

## 2024-05-15 ENCOUNTER — APPOINTMENT (RX ONLY)
Dept: URBAN - METROPOLITAN AREA CLINIC 45 | Facility: CLINIC | Age: 34
Setting detail: DERMATOLOGY
End: 2024-05-15

## 2024-05-15 DIAGNOSIS — L82.0 INFLAMED SEBORRHEIC KERATOSIS: ICD-10-CM

## 2024-05-15 DIAGNOSIS — L28.1 PRURIGO NODULARIS: ICD-10-CM

## 2024-05-15 DIAGNOSIS — L20.89 OTHER ATOPIC DERMATITIS: ICD-10-CM

## 2024-05-15 DIAGNOSIS — L85.3 XEROSIS CUTIS: ICD-10-CM

## 2024-05-15 PROCEDURE — ? PRESCRIPTION

## 2024-05-15 PROCEDURE — ? LIQUID NITROGEN

## 2024-05-15 PROCEDURE — 17110 DESTRUCTION B9 LES UP TO 14: CPT

## 2024-05-15 PROCEDURE — ? TREATMENT REGIMEN

## 2024-05-15 PROCEDURE — 99213 OFFICE O/P EST LOW 20 MIN: CPT | Mod: 25

## 2024-05-15 PROCEDURE — ? COUNSELING

## 2024-05-15 RX ORDER — CLINDAMYCIN PHOSPHATE 10 MG/G
AEROSOL, FOAM TOPICAL
Qty: 100 | Refills: 6 | Status: ERX

## 2024-05-15 RX ORDER — CLINDAMYCIN PHOSPHATE 1 %
KIT TOPICAL
Qty: 30 | Refills: 7 | Status: ERX

## 2024-05-15 RX ORDER — CLOBETASOL PROPIONATE 0.5 MG/G
AEROSOL, FOAM TOPICAL
Qty: 100 | Refills: 6 | Status: ERX | COMMUNITY
Start: 2024-05-15

## 2024-05-15 RX ORDER — FLURANDRENOLIDE 4 UG/CM2
TAPE TOPICAL
Qty: 1 | Refills: 3 | Status: CANCELLED
Stop reason: CLARIF

## 2024-05-15 RX ORDER — VALACYCLOVIR HYDROCHLORIDE 1 G/1
TABLET, FILM COATED ORAL
Qty: 60 | Refills: 3 | Status: ERX | COMMUNITY
Start: 2024-05-15

## 2024-05-15 RX ORDER — HYDROCORTISONE ACETATE, IODOQUINOL 19; 10 MG/G; MG/G
CREAM TOPICAL
Qty: 30 | Refills: 12 | Status: ERX

## 2024-05-15 RX ORDER — DOXYCYCLINE HYCLATE 100 MG/1
CAPSULE, GELATIN COATED ORAL
Qty: 60 | Refills: 6 | Status: CANCELLED
Stop reason: CLARIF

## 2024-05-15 RX ORDER — SULFACETAMIDE SODIUM, SULFUR 98; 48 MG/G; MG/G
LIQUID TOPICAL
Qty: 285 | Refills: 6 | Status: CANCELLED
Stop reason: CLARIF

## 2024-05-15 RX ORDER — MINOCYCLINE HYDROCHLORIDE 115 MG/1
TABLET, FILM COATED, EXTENDED RELEASE ORAL
Qty: 30 | Refills: 7 | Status: ERX

## 2024-05-15 RX ADMIN — VALACYCLOVIR HYDROCHLORIDE: 1 TABLET, FILM COATED ORAL at 00:00

## 2024-05-15 RX ADMIN — CLOBETASOL PROPIONATE: 0.5 AEROSOL, FOAM TOPICAL at 00:00

## 2024-05-15 ASSESSMENT — LOCATION DETAILED DESCRIPTION DERM
LOCATION DETAILED: LEFT INFERIOR VERMILION LIP
LOCATION DETAILED: RIGHT ELBOW
LOCATION DETAILED: LEFT ELBOW

## 2024-05-15 ASSESSMENT — LOCATION ZONE DERM
LOCATION ZONE: LIP
LOCATION ZONE: ARM

## 2024-05-15 ASSESSMENT — LOCATION SIMPLE DESCRIPTION DERM
LOCATION SIMPLE: LEFT LIP
LOCATION SIMPLE: RIGHT ELBOW
LOCATION SIMPLE: LEFT ELBOW

## 2024-05-15 NOTE — PROCEDURE: TREATMENT REGIMEN
Plan: Location: submental chin and right temple\\nPrescribe:\\nClindacin ETZ 1 % topical kit(Apply to the affected areas once daily when needed)\\nRefilling the following: CLINDACIN ETZ WIPES, DOXYCYCLINE HYCLATE 100MG BID, PLEXION TOPICAL CLEANSE, evoclin, Solodyn \\n\\n05/15/2024\\nPhotos taken\\n\\nPatient is here for a follow up\\nPt states he was off of Doxycycline 100mg temporarily and had a flare up on his thigh\\nPt states he has been sticking to current regimen and it is working for him\\nToday pt would like refills on all medication\\nDiscussed with pt that after further evaluation, he had a boil that is now resolving.\\nDiscussed with pt to continue taking Doxycycline 200mg daily to avoid a flare up\\nPt mistakenly thought he had plaque psoriasis, today I clarified that he does not have plaque psoriasis and does not require cosentyx biologic at this time.\\nWe will continue on Solodyn to his regimen as well as evoclin foam\\nWe will also refill all medication\\n\\n\\nPLAN:\\n\\n1. Patient is to continue taking oral Doxycycline(200mg daily), if further treatment is required he was then instructed to take oral Solodyn \\n2. Continue cleansing face with Plexion topical cleanser daily\\n3. Continue using ETZ WIPES as needed or throughout the day\\n4. Patient was instructed to apply a pea sized amount of the compounding medication nightly to the T zone to help with preventing acne flares.\\n5. Continue using CLN wash on his back, thighs\\n\\nHe is to continue regimen and follow up in 6 months
Detail Level: Zone
Plan: Location: R hand and genitals\\nPrescription: Clobetasol 0.05% foam , Vytone cream\\n\\nPhotos taken\\n\\nPt comes in today for a dry patch on his r hand \\nPt also has an irritation on his genitals\\nPt states the rash appeared about 2 months ago and he is currently not applying anything to it\\nOverall, pt is here for further treatment and evaluation \\n\\nEducated patient that eczema is an inflammatory condition triggered with stress, lack of sleep, and also has a genetic component\\nEducated patient to limit soap to only oily areas of the body to prevent stripping natural oils\\nRecommend using Cerave MC to help reestablish a healthy skin barrier\\nAlso recommended patient to take an antihistamine to help decrease histamine production\\nToday I will start pt on Clobetasol foam to apply to his hand as needed \\nAdditionally, I will prescribe pt Vytone cream to apply to his genitals\\nDiscussed with pt to follow up with a moisturizer such as CeraVe to repair his skin barrier\\n\\nPt f/u prn
Plan: Location: lower lip\\nTreatment : LN2\\nPrescription: Valtrex\\n\\nPhotos taken\\n\\nPt comes in today for a recurrent lesion on his lower lips\\nPt previously had lesion biopsied on 06/27/2019\\nWe have previously treated pt’s lip with LN2\\nToday I will treat pt’s lower lip with LN2\\nAdditionally, I will start pt on Valtrex to take during the onset of a flare up\\nPt f/u prn

## 2024-05-15 NOTE — PROCEDURE: LIQUID NITROGEN
Render Note In Bullet Format When Appropriate: No
Detail Level: Detailed
Consent: The patient's consent was obtained including but not limited to risks of crusting, scabbing, blistering, scarring, darker or lighter pigmentary change, recurrence, incomplete removal and infection.
Show Topical Anesthesia Variable?: Yes
Spray Paint Text: The liquid nitrogen was applied to the skin utilizing a spray paint frosting technique.
Number Of Freeze-Thaw Cycles: 3 freeze-thaw cycles
Medical Necessity Clause: This procedure was medically necessary because the lesions that were treated were:
Post-Care Instructions: I reviewed with the patient in detail post-care instructions. Patient is to wear sunprotection, and avoid picking at any of the treated lesions. Pt may apply Vaseline to crusted or scabbing areas.
Medical Necessity Information: It is in your best interest to select a reason for this procedure from the list below. All of these items fulfill various CMS LCD requirements except the new and changing color options.

## 2024-06-13 ENCOUNTER — APPOINTMENT (RX ONLY)
Dept: URBAN - METROPOLITAN AREA CLINIC 45 | Facility: CLINIC | Age: 34
Setting detail: DERMATOLOGY
End: 2024-06-13

## 2024-06-13 DIAGNOSIS — L81.4 OTHER MELANIN HYPERPIGMENTATION: ICD-10-CM

## 2024-06-13 DIAGNOSIS — L85.3 XEROSIS CUTIS: ICD-10-CM

## 2024-06-13 DIAGNOSIS — D22 MELANOCYTIC NEVI: ICD-10-CM

## 2024-06-13 DIAGNOSIS — L82.0 INFLAMED SEBORRHEIC KERATOSIS: ICD-10-CM

## 2024-06-13 DIAGNOSIS — B07.8 OTHER VIRAL WARTS: ICD-10-CM

## 2024-06-13 PROBLEM — D48.5 NEOPLASM OF UNCERTAIN BEHAVIOR OF SKIN: Status: ACTIVE | Noted: 2024-06-13

## 2024-06-13 PROBLEM — D37.01 NEOPLASM OF UNCERTAIN BEHAVIOR OF LIP: Status: ACTIVE | Noted: 2024-06-13

## 2024-06-13 PROCEDURE — 99213 OFFICE O/P EST LOW 20 MIN: CPT | Mod: 25

## 2024-06-13 PROCEDURE — ? TREATMENT REGIMEN

## 2024-06-13 PROCEDURE — ? BIOPSY BY SHAVE METHOD

## 2024-06-13 PROCEDURE — ? COUNSELING

## 2024-06-13 PROCEDURE — 40490 BIOPSY OF LIP: CPT

## 2024-06-13 PROCEDURE — ? LIQUID NITROGEN

## 2024-06-13 PROCEDURE — 11102 TANGNTL BX SKIN SINGLE LES: CPT | Mod: 59

## 2024-06-13 PROCEDURE — ? PRESCRIPTION

## 2024-06-13 PROCEDURE — 17110 DESTRUCTION B9 LES UP TO 14: CPT | Mod: NC

## 2024-06-13 RX ORDER — VALACYCLOVIR HYDROCHLORIDE 1 G/1
TABLET, FILM COATED ORAL
Qty: 60 | Refills: 3 | Status: CANCELLED
Stop reason: CLARIF

## 2024-06-13 ASSESSMENT — LOCATION SIMPLE DESCRIPTION DERM
LOCATION SIMPLE: LEFT ELBOW
LOCATION SIMPLE: RIGHT FOREARM
LOCATION SIMPLE: LEFT SCROTUM
LOCATION SIMPLE: LEFT UPPER BACK
LOCATION SIMPLE: LEFT LIP

## 2024-06-13 ASSESSMENT — LOCATION DETAILED DESCRIPTION DERM
LOCATION DETAILED: LEFT SUPERIOR LATERAL UPPER BACK
LOCATION DETAILED: LEFT ANTERIOR INFERIOR SCROTUM
LOCATION DETAILED: RIGHT PROXIMAL DORSAL FOREARM
LOCATION DETAILED: LEFT INFERIOR VERMILION LIP
LOCATION DETAILED: RIGHT DISTAL RADIAL DORSAL FOREARM
LOCATION DETAILED: LEFT ELBOW

## 2024-06-13 ASSESSMENT — LOCATION ZONE DERM
LOCATION ZONE: SCROTUM
LOCATION ZONE: ARM
LOCATION ZONE: LIP
LOCATION ZONE: TRUNK

## 2024-06-13 NOTE — PROCEDURE: TREATMENT REGIMEN
Detail Level: Zone
Plan: Location: lower lip\\nTreatment : LN2\\nPrescription: Valtrex\\n\\nPhotos taken\\n\\n6/13/2024\\nPt is here for a follow up for a recurrent lesion on his lower lips. On 6/15/24 We previously treated pt’s lip with LN2 \\nPt previously had lesion biopsied on 06/27/2019 as an Verrucoid Keratosis \\nPt mentions the lesion is being persistent and is here for further evaluation and treatment \\nToday I will treat pt’s lower lip with LN2\\nAddition will perform a shave biopsy. Rule out Inlfamed verrucoid keratosis vs AK vs SCC vs BCC\\nInstructed pt to continue taking Valtrex to take during the onset of a flare up\\nPt f/u prn